# Patient Record
Sex: FEMALE | Race: BLACK OR AFRICAN AMERICAN | NOT HISPANIC OR LATINO | Employment: UNEMPLOYED | URBAN - METROPOLITAN AREA
[De-identification: names, ages, dates, MRNs, and addresses within clinical notes are randomized per-mention and may not be internally consistent; named-entity substitution may affect disease eponyms.]

---

## 2021-05-27 ENCOUNTER — OFFICE VISIT (OUTPATIENT)
Dept: DERMATOLOGY | Facility: CLINIC | Age: 36
End: 2021-05-27
Payer: COMMERCIAL

## 2021-05-27 ENCOUNTER — LAB VISIT (OUTPATIENT)
Dept: LAB | Facility: HOSPITAL | Age: 36
End: 2021-05-27
Attending: DERMATOLOGY
Payer: COMMERCIAL

## 2021-05-27 DIAGNOSIS — L59.0 ERYTHEMA AB IGNE: Primary | ICD-10-CM

## 2021-05-27 DIAGNOSIS — L59.0 ERYTHEMA AB IGNE: ICD-10-CM

## 2021-05-27 LAB
BASOPHILS # BLD AUTO: 0.03 K/UL (ref 0–0.2)
BASOPHILS NFR BLD: 0.8 % (ref 0–1.9)
DIFFERENTIAL METHOD: ABNORMAL
EOSINOPHIL # BLD AUTO: 0.1 K/UL (ref 0–0.5)
EOSINOPHIL NFR BLD: 2.1 % (ref 0–8)
ERYTHROCYTE [DISTWIDTH] IN BLOOD BY AUTOMATED COUNT: 13.7 % (ref 11.5–14.5)
HCT VFR BLD AUTO: 38.3 % (ref 37–48.5)
HGB BLD-MCNC: 11.7 G/DL (ref 12–16)
IMM GRANULOCYTES # BLD AUTO: 0.01 K/UL (ref 0–0.04)
IMM GRANULOCYTES NFR BLD AUTO: 0.3 % (ref 0–0.5)
LYMPHOCYTES # BLD AUTO: 2 K/UL (ref 1–4.8)
LYMPHOCYTES NFR BLD: 51.9 % (ref 18–48)
MCH RBC QN AUTO: 28.8 PG (ref 27–31)
MCHC RBC AUTO-ENTMCNC: 30.5 G/DL (ref 32–36)
MCV RBC AUTO: 94 FL (ref 82–98)
MONOCYTES # BLD AUTO: 0.4 K/UL (ref 0.3–1)
MONOCYTES NFR BLD: 10.8 % (ref 4–15)
NEUTROPHILS # BLD AUTO: 1.3 K/UL (ref 1.8–7.7)
NEUTROPHILS NFR BLD: 34.1 % (ref 38–73)
NRBC BLD-RTO: 0 /100 WBC
PLATELET # BLD AUTO: 248 K/UL (ref 150–450)
PMV BLD AUTO: 13 FL (ref 9.2–12.9)
RBC # BLD AUTO: 4.06 M/UL (ref 4–5.4)
WBC # BLD AUTO: 3.89 K/UL (ref 3.9–12.7)

## 2021-05-27 PROCEDURE — 85613 RUSSELL VIPER VENOM DILUTED: CPT | Performed by: DERMATOLOGY

## 2021-05-27 PROCEDURE — 36415 COLL VENOUS BLD VENIPUNCTURE: CPT | Performed by: DERMATOLOGY

## 2021-05-27 PROCEDURE — 99203 OFFICE O/P NEW LOW 30 MIN: CPT | Mod: S$GLB,,, | Performed by: DERMATOLOGY

## 2021-05-27 PROCEDURE — 99999 PR PBB SHADOW E&M-NEW PATIENT-LVL II: ICD-10-PCS | Mod: PBBFAC,,, | Performed by: DERMATOLOGY

## 2021-05-27 PROCEDURE — 86147 CARDIOLIPIN ANTIBODY EA IG: CPT | Mod: 59 | Performed by: DERMATOLOGY

## 2021-05-27 PROCEDURE — 85025 COMPLETE CBC W/AUTO DIFF WBC: CPT | Performed by: DERMATOLOGY

## 2021-05-27 PROCEDURE — 86038 ANTINUCLEAR ANTIBODIES: CPT | Performed by: DERMATOLOGY

## 2021-05-27 PROCEDURE — 99999 PR PBB SHADOW E&M-NEW PATIENT-LVL II: CPT | Mod: PBBFAC,,, | Performed by: DERMATOLOGY

## 2021-05-27 PROCEDURE — 99203 PR OFFICE/OUTPT VISIT, NEW, LEVL III, 30-44 MIN: ICD-10-PCS | Mod: S$GLB,,, | Performed by: DERMATOLOGY

## 2021-05-27 RX ORDER — TRIAMCINOLONE ACETONIDE 1 MG/G
CREAM TOPICAL 2 TIMES DAILY PRN
Qty: 80 G | Refills: 1 | Status: SHIPPED | OUTPATIENT
Start: 2021-05-27 | End: 2021-06-18

## 2021-05-28 LAB — ANA SER QL IF: NORMAL

## 2021-06-01 ENCOUNTER — PATIENT MESSAGE (OUTPATIENT)
Dept: DERMATOLOGY | Facility: CLINIC | Age: 36
End: 2021-06-01

## 2021-06-01 LAB
CARDIOLIPIN IGG SER IA-ACNC: <9.4 GPL (ref 0–14.99)
CARDIOLIPIN IGM SER IA-ACNC: 9.7 MPL (ref 0–12.49)
LA PPP-IMP: NEGATIVE

## 2021-06-03 ENCOUNTER — PATIENT MESSAGE (OUTPATIENT)
Dept: DERMATOLOGY | Facility: CLINIC | Age: 36
End: 2021-06-03

## 2021-06-04 ENCOUNTER — OFFICE VISIT (OUTPATIENT)
Dept: DERMATOLOGY | Facility: CLINIC | Age: 36
End: 2021-06-04
Payer: COMMERCIAL

## 2021-06-04 DIAGNOSIS — L98.9 DERMATOSIS: Primary | ICD-10-CM

## 2021-06-04 PROCEDURE — 11105 PUNCH BX SKIN EA SEP/ADDL: CPT | Mod: S$GLB,,, | Performed by: DERMATOLOGY

## 2021-06-04 PROCEDURE — 99999 PR PBB SHADOW E&M-EST. PATIENT-LVL III: ICD-10-PCS | Mod: PBBFAC,,, | Performed by: DERMATOLOGY

## 2021-06-04 PROCEDURE — 99999 PR PBB SHADOW E&M-EST. PATIENT-LVL III: CPT | Mod: PBBFAC,,, | Performed by: DERMATOLOGY

## 2021-06-04 PROCEDURE — 88312 SPECIAL STAINS GROUP 1: CPT | Mod: 26,,, | Performed by: PATHOLOGY

## 2021-06-04 PROCEDURE — 88305 TISSUE EXAM BY PATHOLOGIST: ICD-10-PCS | Mod: 26,,, | Performed by: PATHOLOGY

## 2021-06-04 PROCEDURE — 11104 PR PUNCH BIOPSY, SKIN, SINGLE LESION: ICD-10-PCS | Mod: S$GLB,,, | Performed by: DERMATOLOGY

## 2021-06-04 PROCEDURE — 11104 PUNCH BX SKIN SINGLE LESION: CPT | Mod: S$GLB,,, | Performed by: DERMATOLOGY

## 2021-06-04 PROCEDURE — 88305 TISSUE EXAM BY PATHOLOGIST: CPT | Performed by: PATHOLOGY

## 2021-06-04 PROCEDURE — 88312 SPECIAL STAINS GROUP 1: CPT | Performed by: PATHOLOGY

## 2021-06-04 PROCEDURE — 99214 PR OFFICE/OUTPT VISIT, EST, LEVL IV, 30-39 MIN: ICD-10-PCS | Mod: 25,S$GLB,, | Performed by: DERMATOLOGY

## 2021-06-04 PROCEDURE — 11105 PR PUNCH BIOPSY, SKIN, EA ADDTL LESION: ICD-10-PCS | Mod: S$GLB,,, | Performed by: DERMATOLOGY

## 2021-06-04 PROCEDURE — 88312 PR  SPECIAL STAINS,GROUP I: ICD-10-PCS | Mod: 26,,, | Performed by: PATHOLOGY

## 2021-06-04 PROCEDURE — 88305 TISSUE EXAM BY PATHOLOGIST: CPT | Mod: 26,,, | Performed by: PATHOLOGY

## 2021-06-04 PROCEDURE — 99214 OFFICE O/P EST MOD 30 MIN: CPT | Mod: 25,S$GLB,, | Performed by: DERMATOLOGY

## 2021-06-04 RX ORDER — VALACYCLOVIR HYDROCHLORIDE 500 MG/1
TABLET, FILM COATED ORAL
COMMUNITY
Start: 2021-05-12 | End: 2022-08-08

## 2021-06-04 RX ORDER — CIPROFLOXACIN 500 MG/1
TABLET ORAL
COMMUNITY
Start: 2021-04-20 | End: 2021-06-18

## 2021-06-04 RX ORDER — BETAMETHASONE DIPROPIONATE 0.5 MG/G
OINTMENT TOPICAL 2 TIMES DAILY
Qty: 45 G | Refills: 3 | Status: SHIPPED | OUTPATIENT
Start: 2021-06-04 | End: 2021-06-18

## 2021-06-04 RX ORDER — RIZATRIPTAN BENZOATE 10 MG/1
TABLET ORAL
COMMUNITY
Start: 2021-03-12 | End: 2022-11-02

## 2021-06-05 ENCOUNTER — PATIENT MESSAGE (OUTPATIENT)
Dept: DERMATOLOGY | Facility: CLINIC | Age: 36
End: 2021-06-05

## 2021-06-07 ENCOUNTER — PATIENT MESSAGE (OUTPATIENT)
Dept: DERMATOLOGY | Facility: CLINIC | Age: 36
End: 2021-06-07

## 2021-06-07 ENCOUNTER — TELEPHONE (OUTPATIENT)
Dept: DERMATOLOGY | Facility: CLINIC | Age: 36
End: 2021-06-07

## 2021-06-08 ENCOUNTER — PATIENT MESSAGE (OUTPATIENT)
Dept: DERMATOLOGY | Facility: CLINIC | Age: 36
End: 2021-06-08

## 2021-06-09 ENCOUNTER — TELEPHONE (OUTPATIENT)
Dept: DERMATOLOGY | Facility: CLINIC | Age: 36
End: 2021-06-09

## 2021-06-17 ENCOUNTER — TELEPHONE (OUTPATIENT)
Dept: DERMATOLOGY | Facility: CLINIC | Age: 36
End: 2021-06-17

## 2021-06-18 ENCOUNTER — CLINICAL SUPPORT (OUTPATIENT)
Dept: DERMATOLOGY | Facility: CLINIC | Age: 36
End: 2021-06-18
Payer: COMMERCIAL

## 2021-06-18 DIAGNOSIS — I77.6 VASCULITIS: Primary | ICD-10-CM

## 2021-06-18 PROCEDURE — 99999 PR PBB SHADOW E&M-EST. PATIENT-LVL I: ICD-10-PCS | Mod: PBBFAC,,,

## 2021-06-18 PROCEDURE — 87070 CULTURE OTHR SPECIMN AEROBIC: CPT | Performed by: DERMATOLOGY

## 2021-06-18 PROCEDURE — 99499 NO LOS: ICD-10-PCS | Mod: S$GLB,,, | Performed by: DERMATOLOGY

## 2021-06-18 PROCEDURE — 99499 UNLISTED E&M SERVICE: CPT | Mod: S$GLB,,, | Performed by: DERMATOLOGY

## 2021-06-18 PROCEDURE — 99999 PR PBB SHADOW E&M-EST. PATIENT-LVL I: CPT | Mod: PBBFAC,,,

## 2021-06-18 RX ORDER — MUPIROCIN 20 MG/G
OINTMENT TOPICAL
Qty: 30 G | Refills: 1 | Status: SHIPPED | OUTPATIENT
Start: 2021-06-18 | End: 2021-07-14 | Stop reason: SDUPTHER

## 2021-06-18 RX ORDER — PREDNISONE 20 MG/1
TABLET ORAL
Qty: 42 TABLET | Refills: 0 | Status: SHIPPED | OUTPATIENT
Start: 2021-06-18 | End: 2021-11-01

## 2021-06-21 LAB — BACTERIA SPEC AEROBE CULT: NORMAL

## 2021-06-24 ENCOUNTER — OFFICE VISIT (OUTPATIENT)
Dept: DERMATOLOGY | Facility: CLINIC | Age: 36
End: 2021-06-24
Payer: COMMERCIAL

## 2021-06-24 ENCOUNTER — PATIENT MESSAGE (OUTPATIENT)
Dept: DERMATOLOGY | Facility: CLINIC | Age: 36
End: 2021-06-24

## 2021-06-24 ENCOUNTER — LAB VISIT (OUTPATIENT)
Dept: LAB | Facility: HOSPITAL | Age: 36
End: 2021-06-24
Attending: DERMATOLOGY
Payer: COMMERCIAL

## 2021-06-24 DIAGNOSIS — I77.6 VASCULITIS: Primary | ICD-10-CM

## 2021-06-24 DIAGNOSIS — I77.6 VASCULITIS: ICD-10-CM

## 2021-06-24 LAB
ALBUMIN SERPL BCP-MCNC: 3.6 G/DL (ref 3.5–5.2)
ALP SERPL-CCNC: 55 U/L (ref 55–135)
ALT SERPL W/O P-5'-P-CCNC: 15 U/L (ref 10–44)
ANION GAP SERPL CALC-SCNC: 8 MMOL/L (ref 8–16)
AST SERPL-CCNC: 14 U/L (ref 10–40)
BASOPHILS # BLD AUTO: 0.07 K/UL (ref 0–0.2)
BASOPHILS NFR BLD: 0.7 % (ref 0–1.9)
BILIRUB SERPL-MCNC: 0.3 MG/DL (ref 0.1–1)
BUN SERPL-MCNC: 9 MG/DL (ref 6–20)
C3 SERPL-MCNC: 128 MG/DL (ref 50–180)
C4 SERPL-MCNC: 37 MG/DL (ref 11–44)
CALCIUM SERPL-MCNC: 9.7 MG/DL (ref 8.7–10.5)
CHLORIDE SERPL-SCNC: 103 MMOL/L (ref 95–110)
CO2 SERPL-SCNC: 30 MMOL/L (ref 23–29)
COMMENT: NORMAL
CREAT SERPL-MCNC: 0.8 MG/DL (ref 0.5–1.4)
CRP SERPL-MCNC: 3.2 MG/L (ref 0–8.2)
DIFFERENTIAL METHOD: ABNORMAL
EOSINOPHIL # BLD AUTO: 0 K/UL (ref 0–0.5)
EOSINOPHIL NFR BLD: 0.2 % (ref 0–8)
ERYTHROCYTE [DISTWIDTH] IN BLOOD BY AUTOMATED COUNT: 14.1 % (ref 11.5–14.5)
ERYTHROCYTE [SEDIMENTATION RATE] IN BLOOD BY WESTERGREN METHOD: 14 MM/HR (ref 0–36)
EST. GFR  (AFRICAN AMERICAN): >60 ML/MIN/1.73 M^2
EST. GFR  (NON AFRICAN AMERICAN): >60 ML/MIN/1.73 M^2
FINAL PATHOLOGIC DIAGNOSIS: NORMAL
GLUCOSE SERPL-MCNC: 87 MG/DL (ref 70–110)
GROSS: NORMAL
HAV IGM SERPL QL IA: NEGATIVE
HBV CORE IGM SERPL QL IA: NEGATIVE
HBV SURFACE AG SERPL QL IA: NEGATIVE
HCT VFR BLD AUTO: 37.3 % (ref 37–48.5)
HCV AB SERPL QL IA: NEGATIVE
HGB BLD-MCNC: 11.4 G/DL (ref 12–16)
HIV 1+2 AB+HIV1 P24 AG SERPL QL IA: NEGATIVE
IMM GRANULOCYTES # BLD AUTO: 0.04 K/UL (ref 0–0.04)
IMM GRANULOCYTES NFR BLD AUTO: 0.4 % (ref 0–0.5)
LYMPHOCYTES # BLD AUTO: 4.3 K/UL (ref 1–4.8)
LYMPHOCYTES NFR BLD: 43.6 % (ref 18–48)
Lab: NORMAL
MCH RBC QN AUTO: 29.5 PG (ref 27–31)
MCHC RBC AUTO-ENTMCNC: 30.6 G/DL (ref 32–36)
MCV RBC AUTO: 96 FL (ref 82–98)
MICROSCOPIC EXAM: NORMAL
MONOCYTES # BLD AUTO: 0.7 K/UL (ref 0.3–1)
MONOCYTES NFR BLD: 6.8 % (ref 4–15)
NEUTROPHILS # BLD AUTO: 4.8 K/UL (ref 1.8–7.7)
NEUTROPHILS NFR BLD: 48.3 % (ref 38–73)
NRBC BLD-RTO: 0 /100 WBC
PLATELET # BLD AUTO: 372 K/UL (ref 150–450)
PMV BLD AUTO: 12.1 FL (ref 9.2–12.9)
POTASSIUM SERPL-SCNC: 3.8 MMOL/L (ref 3.5–5.1)
PROT SERPL-MCNC: 7.2 G/DL (ref 6–8.4)
RBC # BLD AUTO: 3.87 M/UL (ref 4–5.4)
SODIUM SERPL-SCNC: 141 MMOL/L (ref 136–145)
WBC # BLD AUTO: 9.94 K/UL (ref 3.9–12.7)

## 2021-06-24 PROCEDURE — 99214 PR OFFICE/OUTPT VISIT, EST, LEVL IV, 30-39 MIN: ICD-10-PCS | Mod: S$GLB,,, | Performed by: DERMATOLOGY

## 2021-06-24 PROCEDURE — 86140 C-REACTIVE PROTEIN: CPT | Performed by: DERMATOLOGY

## 2021-06-24 PROCEDURE — 80053 COMPREHEN METABOLIC PANEL: CPT | Performed by: DERMATOLOGY

## 2021-06-24 PROCEDURE — 86160 COMPLEMENT ANTIGEN: CPT | Mod: 59 | Performed by: DERMATOLOGY

## 2021-06-24 PROCEDURE — 36415 COLL VENOUS BLD VENIPUNCTURE: CPT | Performed by: DERMATOLOGY

## 2021-06-24 PROCEDURE — 86703 HIV-1/HIV-2 1 RESULT ANTBDY: CPT | Performed by: DERMATOLOGY

## 2021-06-24 PROCEDURE — 80074 ACUTE HEPATITIS PANEL: CPT | Performed by: DERMATOLOGY

## 2021-06-24 PROCEDURE — 86160 COMPLEMENT ANTIGEN: CPT | Performed by: DERMATOLOGY

## 2021-06-24 PROCEDURE — 83516 IMMUNOASSAY NONANTIBODY: CPT | Performed by: DERMATOLOGY

## 2021-06-24 PROCEDURE — 85025 COMPLETE CBC W/AUTO DIFF WBC: CPT | Performed by: DERMATOLOGY

## 2021-06-24 PROCEDURE — 82595 ASSAY OF CRYOGLOBULIN: CPT | Performed by: DERMATOLOGY

## 2021-06-24 PROCEDURE — 85652 RBC SED RATE AUTOMATED: CPT | Performed by: DERMATOLOGY

## 2021-06-24 PROCEDURE — 99999 PR PBB SHADOW E&M-EST. PATIENT-LVL III: ICD-10-PCS | Mod: PBBFAC,,, | Performed by: DERMATOLOGY

## 2021-06-24 PROCEDURE — 99214 OFFICE O/P EST MOD 30 MIN: CPT | Mod: S$GLB,,, | Performed by: DERMATOLOGY

## 2021-06-24 PROCEDURE — 86038 ANTINUCLEAR ANTIBODIES: CPT | Performed by: DERMATOLOGY

## 2021-06-24 PROCEDURE — 86255 FLUORESCENT ANTIBODY SCREEN: CPT | Performed by: DERMATOLOGY

## 2021-06-24 PROCEDURE — 82955 ASSAY OF G6PD ENZYME: CPT | Performed by: DERMATOLOGY

## 2021-06-24 PROCEDURE — 86162 COMPLEMENT TOTAL (CH50): CPT | Performed by: DERMATOLOGY

## 2021-06-24 PROCEDURE — 87070 CULTURE OTHR SPECIMN AEROBIC: CPT | Performed by: DERMATOLOGY

## 2021-06-24 PROCEDURE — 99999 PR PBB SHADOW E&M-EST. PATIENT-LVL III: CPT | Mod: PBBFAC,,, | Performed by: DERMATOLOGY

## 2021-06-25 LAB — ANA SER QL IF: NORMAL

## 2021-06-28 LAB
ANCA AB TITR SER IF: NORMAL TITER
CH50 SERPL-ACNC: 77 U/ML (ref 42–95)
G6PD RBC-CCNT: 13.8 U/G HGB (ref 7–20.5)
MYELOPEROXIDASE AB SER-ACNC: 2 UNITS
P-ANCA TITR SER IF: NORMAL TITER

## 2021-06-29 LAB — BACTERIA SPEC AEROBE CULT: NORMAL

## 2021-07-06 ENCOUNTER — PATIENT MESSAGE (OUTPATIENT)
Dept: DERMATOLOGY | Facility: CLINIC | Age: 36
End: 2021-07-06

## 2021-07-06 LAB — CRYOGLOB SER QL: NORMAL

## 2023-07-07 ENCOUNTER — OFFICE VISIT (OUTPATIENT)
Dept: OPHTHALMOLOGY | Facility: CLINIC | Age: 38
End: 2023-07-07
Payer: COMMERCIAL

## 2023-07-07 ENCOUNTER — PATIENT MESSAGE (OUTPATIENT)
Dept: OPHTHALMOLOGY | Facility: CLINIC | Age: 38
End: 2023-07-07

## 2023-07-07 ENCOUNTER — HOSPITAL ENCOUNTER (OUTPATIENT)
Dept: RADIOLOGY | Facility: HOSPITAL | Age: 38
Discharge: HOME | End: 2023-07-07
Attending: OPTOMETRIST
Payer: COMMERCIAL

## 2023-07-07 DIAGNOSIS — H47.10 OPTIC NERVE EDEMA: ICD-10-CM

## 2023-07-07 DIAGNOSIS — H46.9 OPTIC NEURITIS, LEFT: Primary | ICD-10-CM

## 2023-07-07 DIAGNOSIS — H47.10 EDEMA OF OPTIC DISC OF LEFT EYE: ICD-10-CM

## 2023-07-07 PROCEDURE — 99204 OFFICE O/P NEW MOD 45 MIN: CPT | Mod: S$GLB,,, | Performed by: OPTOMETRIST

## 2023-07-07 PROCEDURE — 70553 MRI BRAIN STEM W/O & W/DYE: CPT | Mod: 26,,, | Performed by: STUDENT IN AN ORGANIZED HEALTH CARE EDUCATION/TRAINING PROGRAM

## 2023-07-07 PROCEDURE — 70543 MRI ORBT/FAC/NCK W/O &W/DYE: CPT | Mod: TC,PN

## 2023-07-07 PROCEDURE — A9585 GADOBUTROL INJECTION: HCPCS | Mod: PN | Performed by: OPTOMETRIST

## 2023-07-07 PROCEDURE — 1160F RVW MEDS BY RX/DR IN RCRD: CPT | Mod: CPTII,S$GLB,, | Performed by: OPTOMETRIST

## 2023-07-07 PROCEDURE — 70543 MRI HEAD-ORBITS W/WO CONTRAST (XPD): ICD-10-PCS | Mod: 26,,, | Performed by: STUDENT IN AN ORGANIZED HEALTH CARE EDUCATION/TRAINING PROGRAM

## 2023-07-07 PROCEDURE — 25500020 PHARM REV CODE 255: Mod: PN | Performed by: OPTOMETRIST

## 2023-07-07 PROCEDURE — 92083 HUMPHREY VISUAL FIELD - OU - BOTH EYES: ICD-10-PCS | Mod: S$GLB,,, | Performed by: OPTOMETRIST

## 2023-07-07 PROCEDURE — 70553 MRI BRAIN STEM W/O & W/DYE: CPT | Mod: PN

## 2023-07-07 PROCEDURE — 99999 PR PBB SHADOW E&M-EST. PATIENT-LVL III: CPT | Mod: PBBFAC,,, | Performed by: OPTOMETRIST

## 2023-07-07 PROCEDURE — 92083 EXTENDED VISUAL FIELD XM: CPT | Mod: S$GLB,,, | Performed by: OPTOMETRIST

## 2023-07-07 PROCEDURE — 70553 MRI HEAD-ORBITS W/WO CONTRAST (XPD): ICD-10-PCS | Mod: 26,,, | Performed by: STUDENT IN AN ORGANIZED HEALTH CARE EDUCATION/TRAINING PROGRAM

## 2023-07-07 PROCEDURE — 1159F MED LIST DOCD IN RCRD: CPT | Mod: CPTII,S$GLB,, | Performed by: OPTOMETRIST

## 2023-07-07 PROCEDURE — 1159F PR MEDICATION LIST DOCUMENTED IN MEDICAL RECORD: ICD-10-PCS | Mod: CPTII,S$GLB,, | Performed by: OPTOMETRIST

## 2023-07-07 PROCEDURE — 1160F PR REVIEW ALL MEDS BY PRESCRIBER/CLIN PHARMACIST DOCUMENTED: ICD-10-PCS | Mod: CPTII,S$GLB,, | Performed by: OPTOMETRIST

## 2023-07-07 PROCEDURE — 99999 PR PBB SHADOW E&M-EST. PATIENT-LVL III: ICD-10-PCS | Mod: PBBFAC,,, | Performed by: OPTOMETRIST

## 2023-07-07 PROCEDURE — 99204 PR OFFICE/OUTPT VISIT, NEW, LEVL IV, 45-59 MIN: ICD-10-PCS | Mod: S$GLB,,, | Performed by: OPTOMETRIST

## 2023-07-07 PROCEDURE — 70543 MRI ORBT/FAC/NCK W/O &W/DYE: CPT | Mod: 26,,, | Performed by: STUDENT IN AN ORGANIZED HEALTH CARE EDUCATION/TRAINING PROGRAM

## 2023-07-07 RX ORDER — IBUPROFEN 800 MG/1
800 TABLET ORAL
COMMUNITY
Start: 2023-05-20 | End: 2023-11-08

## 2023-07-07 RX ORDER — HYDROCORTISONE 25 MG/G
CREAM TOPICAL
COMMUNITY
Start: 2022-11-18 | End: 2023-11-08

## 2023-07-07 RX ORDER — GADOBUTROL 604.72 MG/ML
10 INJECTION INTRAVENOUS
Status: COMPLETED | OUTPATIENT
Start: 2023-07-07 | End: 2023-07-07

## 2023-07-07 RX ORDER — MUPIROCIN 20 MG/G
OINTMENT TOPICAL 2 TIMES DAILY
COMMUNITY
Start: 2023-02-17 | End: 2023-11-08

## 2023-07-07 RX ORDER — DOXYCYCLINE 100 MG/1
100 CAPSULE ORAL EVERY 12 HOURS
COMMUNITY
Start: 2023-07-06 | End: 2023-07-21

## 2023-07-07 RX ADMIN — GADOBUTROL 10 ML: 604.72 INJECTION INTRAVENOUS at 02:07

## 2023-07-07 NOTE — PROGRESS NOTES
"HPI     Annual Exam            Comments: NP          Comments    Vision changes since last eye exam?: States that within the last 2 weeks   her OS has been feeling  puffy watery and lots of pressure. States that   she initially thought it was pink eye but when she saw her PCP started her   on Doxycyline. States that her vision feels like its going in and out.     Any eye pain today: no OS just feels tight    Other ocular symptoms: states that she did see some floaters this morning    Interested in contact lens fitting today? no                     Last edited by Khushboo Hutchinson on 7/7/2023  9:52 AM.            Assessment /Plan     For exam results, see Encounter Report.    Optic neuritis, left    Edema of optic disc of left eye    Optic nerve edema    -     MRI Head Orbits W/Wo Contrast (XPD); Future; Expected date: 07/07/2023  -     C-Reactive Protein; Future; Expected date: 07/07/2023  -     Sedimentation rate; Future; Expected date: 07/07/2023  -     Cuevas Visual Field - OU - Extended - Both Eyes      Superior aspect of left disc margin blurred-trace edema  No hemes  Retina intact   test: full OD, "parts of cap missing, but just as red" OS  EOMs: full, Mild pulling pain when looking to the left and right    VF today shows left inferior altitudinal defect which corresponds with patients symptoms as well as with CF  Macula splitting inferior alt defect OS only-follows horizontal midline closely- consistent with NAAION            Suspect NAAION vs optic neuritis  MRI today at 14:00  ESR and CRP to r/o GCA/ AAION  Will call pt to discuss results of MRI:       MRI report:  FINDINGS:  Orbits/Optic Nerves: There is T2 hyperintense signal of the left optic nerve with associated enhancement.  It demonstrates normal size and course.  Right optic nerve is normal in course, size, and signal without abnormal enhancement.  No abnormal signal or enhancement of the right or left globe, pre-septal soft tissues, " intra-orbital soft tissues, or remainder of the optic pathway.     Remainder of the Intracranial Compartment:     Ventricles and sulci are normal in size for age without evidence of hydrocephalus. No extra-axial blood or fluid collections.     Small foci of T2/FLAIR hyperintense signal in the subcortical white matter of the frontal lobes.  No mass lesion, acute hemorrhage, edema, or acute infarct. No abnormal intra-axial enhancement.     Normal vascular flow voids are preserved.     Skull/Extracranial Contents (limited evaluation): Bone marrow signal intensity is normal.     Impression:     Findings suggesting left optic neuritis.     Nonspecific T2/FLAIR hyperintense foci in the subcortical white matter of the frontal lobes commonly seen in the setting of chronic microvascular ischemia although additional differential considerations including migraine and other nonspecified vasculopathy as well as demyelinating disease may be considered.     No acute intracranial abnormality or abnormal enhancement.        Electronically signed by: Woodrow Carlos  Date:                                            07/07/2023  Time:                                           15:11    Optic neuritis OS on MRI  Consult neuro ophthalmology

## 2023-07-07 NOTE — Clinical Note
Harsha Beavers, I wish that I could send to you as an e-consult. Not sure that is up and running yet though! Could you please take a look at Ms William' chart when you have a minute and let me know what you would recommend for her. Should she see Neurology or you guys or does she need to start any treatment now? Symptoms began about 2 weeks ago and MRI report does not indicate typical area of demyelination.   Very grateful for your input here.  Thank you, Luther

## 2023-07-10 ENCOUNTER — TELEPHONE (OUTPATIENT)
Dept: OPHTHALMOLOGY | Facility: CLINIC | Age: 38
End: 2023-07-10
Payer: COMMERCIAL

## 2023-07-10 ENCOUNTER — PATIENT MESSAGE (OUTPATIENT)
Dept: OPHTHALMOLOGY | Facility: CLINIC | Age: 38
End: 2023-07-10
Payer: COMMERCIAL

## 2023-07-10 DIAGNOSIS — H46.9 OPTIC NEURITIS, LEFT: Primary | ICD-10-CM

## 2023-07-10 NOTE — TELEPHONE ENCOUNTER
"Called pt this morning and spoke to her. She said that her eye pain "pressure" felt worse and she went to the ER at [facility]-notes not available to review as they are not on [facility].  Had neuro consult in ER, Neurologist reviewed her MyChart notes, was started on IV steroids and was given po steroids at discharge. She feels much better today and feels like her vision has improved. I told her that I would get in touch again with her after hearing from Neuro ophth.   "

## 2023-07-11 ENCOUNTER — LAB VISIT (OUTPATIENT)
Dept: LAB | Facility: HOSPITAL | Age: 38
End: 2023-07-11
Attending: INTERNAL MEDICINE
Payer: COMMERCIAL

## 2023-07-11 DIAGNOSIS — H46.9 OPTIC NEURITIS, LEFT: ICD-10-CM

## 2023-07-11 LAB — HIV 1+2 AB+HIV1 P24 AG SERPL QL IA: NORMAL

## 2023-07-11 PROCEDURE — 86038 ANTINUCLEAR ANTIBODIES: CPT | Performed by: OPTOMETRIST

## 2023-07-11 PROCEDURE — 82164 ANGIOTENSIN I ENZYME TEST: CPT | Performed by: OPTOMETRIST

## 2023-07-11 PROCEDURE — 86235 NUCLEAR ANTIGEN ANTIBODY: CPT | Mod: 59 | Performed by: OPTOMETRIST

## 2023-07-11 PROCEDURE — 87389 HIV-1 AG W/HIV-1&-2 AB AG IA: CPT | Performed by: OPTOMETRIST

## 2023-07-11 PROCEDURE — 86592 SYPHILIS TEST NON-TREP QUAL: CPT | Performed by: OPTOMETRIST

## 2023-07-11 PROCEDURE — 86611 BARTONELLA ANTIBODY: CPT | Mod: 91 | Performed by: OPTOMETRIST

## 2023-07-11 PROCEDURE — 86235 NUCLEAR ANTIGEN ANTIBODY: CPT | Performed by: OPTOMETRIST

## 2023-07-12 LAB
ANA SER-ACNC: NORMAL
ANTI-SSA ANTIBODY: 0.07 RATIO (ref 0–0.99)
ANTI-SSA INTERPRETATION: NEGATIVE
ANTI-SSB ANTIBODY: 0.07 RATIO (ref 0–0.99)
ANTI-SSB INTERPRETATION: NEGATIVE
RPR SER QL: NORMAL

## 2023-07-13 ENCOUNTER — TELEPHONE (OUTPATIENT)
Dept: NEUROLOGY | Facility: CLINIC | Age: 38
End: 2023-07-13
Payer: COMMERCIAL

## 2023-07-13 LAB
ACE SERPL-CCNC: 33 U/L (ref 16–85)
B HENSELAE IGG TITR SER IF: NORMAL TITER
B HENSELAE IGM TITR SER IF: NORMAL TITER
B QUINTANA IGG TITR SER IF: NORMAL TITER
B QUINTANA IGM TITR SER IF: NORMAL TITER

## 2023-07-13 NOTE — TELEPHONE ENCOUNTER
----- Message from Benjamin Enriquze sent at 7/13/2023 10:21 AM CDT -----  Contact: Briana Gonzalez is calling in regards to scheduling an appt from a referral. Pt would like an appt sooner than 06/25/24, preferably this year. Please call back at [phone]              Thanks  MAC

## 2023-07-18 ENCOUNTER — LAB VISIT (OUTPATIENT)
Dept: LAB | Facility: HOSPITAL | Age: 38
End: 2023-07-18
Attending: OPHTHALMOLOGY
Payer: COMMERCIAL

## 2023-07-18 ENCOUNTER — OFFICE VISIT (OUTPATIENT)
Dept: OPHTHALMOLOGY | Facility: CLINIC | Age: 38
End: 2023-07-18
Payer: COMMERCIAL

## 2023-07-18 DIAGNOSIS — H46.12 OPTIC NEURITIS, RETROBULBAR (ACUTE), LEFT: Primary | ICD-10-CM

## 2023-07-18 DIAGNOSIS — H46.12 OPTIC NEURITIS, RETROBULBAR (ACUTE), LEFT: ICD-10-CM

## 2023-07-18 PROCEDURE — 1160F RVW MEDS BY RX/DR IN RCRD: CPT | Mod: CPTII,S$GLB,, | Performed by: OPHTHALMOLOGY

## 2023-07-18 PROCEDURE — 99999 PR PBB SHADOW E&M-EST. PATIENT-LVL III: ICD-10-PCS | Mod: PBBFAC,,, | Performed by: OPHTHALMOLOGY

## 2023-07-18 PROCEDURE — 1159F PR MEDICATION LIST DOCUMENTED IN MEDICAL RECORD: ICD-10-PCS | Mod: CPTII,S$GLB,, | Performed by: OPHTHALMOLOGY

## 2023-07-18 PROCEDURE — 1159F MED LIST DOCD IN RCRD: CPT | Mod: CPTII,S$GLB,, | Performed by: OPHTHALMOLOGY

## 2023-07-18 PROCEDURE — 99205 PR OFFICE/OUTPT VISIT, NEW, LEVL V, 60-74 MIN: ICD-10-PCS | Mod: S$GLB,,, | Performed by: OPHTHALMOLOGY

## 2023-07-18 PROCEDURE — 36415 COLL VENOUS BLD VENIPUNCTURE: CPT | Performed by: OPHTHALMOLOGY

## 2023-07-18 PROCEDURE — 86053 AQAPRN-4 ANTB FLO CYTMTRY EA: CPT | Performed by: OPHTHALMOLOGY

## 2023-07-18 PROCEDURE — 99999 PR PBB SHADOW E&M-EST. PATIENT-LVL III: CPT | Mod: PBBFAC,,, | Performed by: OPHTHALMOLOGY

## 2023-07-18 PROCEDURE — 99205 OFFICE O/P NEW HI 60 MIN: CPT | Mod: S$GLB,,, | Performed by: OPHTHALMOLOGY

## 2023-07-18 PROCEDURE — 1160F PR REVIEW ALL MEDS BY PRESCRIBER/CLIN PHARMACIST DOCUMENTED: ICD-10-PCS | Mod: CPTII,S$GLB,, | Performed by: OPHTHALMOLOGY

## 2023-07-18 RX ORDER — PREDNISONE 20 MG/1
TABLET ORAL
COMMUNITY
Start: 2023-07-08 | End: 2023-11-08

## 2023-07-18 NOTE — PATIENT INSTRUCTIONS
The condition  is consistent with acute retrobulbar optic neuritis. Optic neuritis discussed in detail. New referral placed to neurology for further diagnostic testing. Blood ordered for NMO and MOG today. Referral sent to neurology for continued care. Repeat exam, HVF, and OCT  in 2 months.

## 2023-07-18 NOTE — LETTER
[facility] - 10th Fl  [address]  [facility] [address]  Phone: [phone]  Fax: [phone]   July 18, 2023    [place]  [address]  [place] [address]    Patient: Briana Thomas   MR Number: [id number]   YOB: 1985   Date of Visit: 7/18/2023       Dear Dr. Titus:    Thank you for referring Briana Thomas to me for evaluation. Here is my assessment and plan of care:    Assessment/Plan    For exam results, see Encounter Report.    Optic neuritis, retrobulbar (acute), left  -     Ambulatory referral/consult to Neurology; Future; Expected date: 07/25/2023      The condition  is consistent with acute retrobulbar optic neuritis. Optic neuritis discussed in detail. New referral placed to neurology for further diagnostic testing. Blood ordered for NMO and MOG today. Referral sent to neurology for continued care. Repeat exam, HVF, and OCT  in 2 months.          Below you will find my full exam findings. If you have questions, please do not hesitate to call me. I look forward to following Ms. Briana Thomas along with you.    Sincerely,           Bennie Gage MD       CC  No Recipients             Base Eye Exam       Visual Acuity (Snellen - Linear)         Right Left    Dist sc 20/20 CF at 3'              Tonometry (Tonopen, 3:58 PM)         Right Left    Pressure 21 11              Pupils         Dark Light Shape React APD    Right 4 2 Round Brisk None    Left 4 2 Round Brisk +2              Visual Felipe    HVF from Dr. Titus's office dated 7/7/2023 full OD, inferior altitudinal defectt OS.             Extraocular Movement         Right Left     Full, Ortho Full, Ortho              Neuro/Psych       Oriented x3: Yes    Mood/Affect: Normal              Dilation       Both eyes: 2.5% Phenylephrine, 1% Mydriacyl @ 3:57 PM                  Slit Lamp and Fundus Exam       External Exam         Right Left    External Normal Normal              Slit Lamp Exam          Right Left    Lids/Lashes Normal Normal    Conjunctiva/Sclera White and quiet White and quiet    Cornea Clear Clear    Anterior Chamber Deep and quiet Deep and quiet    Iris Round and reactive Round and reactive    Lens Clear Clear    Vitreous Normal Normal              Fundus Exam         Right Left    Disc Normal Normal    C/D Ratio 0.4 0.4    Macula Normal Normal    Vessels Normal Normal    Periphery Normal Normal

## 2023-07-18 NOTE — PROGRESS NOTES
HPI    Referred by Dr.Donavon Titus OD   Patient here for OS Optic Neuritis.  Patient states OS vision blurry/film since 06/2023 which seem to be   getting better.  No eye pain, but still feeling some tightness.(Sinus pressure)    I have personally interviewed the patient, reviewed the history and   examined the patient and agree with the technician's exam.   Last edited by Bennie Gage MD on 7/18/2023  3:22 PM.            Assessment /Plan     For exam results, see Encounter Report.    Optic neuritis, retrobulbar (acute), left  -     Ambulatory referral/consult to Neurology; Future; Expected date: 07/25/2023      The condition  is consistent with acute retrobulbar optic neuritis. Optic neuritis discussed in detail. New referral placed to neurology for further diagnostic testing. Blood ordered for NMO and MOG today. Referral sent to neurology for continued care. Repeat exam, HVF, and OCT  in 2 months.

## 2023-07-21 ENCOUNTER — LAB VISIT (OUTPATIENT)
Dept: LAB | Facility: HOSPITAL | Age: 38
End: 2023-07-21
Attending: PSYCHIATRY & NEUROLOGY
Payer: COMMERCIAL

## 2023-07-21 ENCOUNTER — OFFICE VISIT (OUTPATIENT)
Dept: NEUROLOGY | Facility: CLINIC | Age: 38
End: 2023-07-21
Payer: COMMERCIAL

## 2023-07-21 VITALS
HEART RATE: 79 BPM | SYSTOLIC BLOOD PRESSURE: 143 MMHG | HEIGHT: 64 IN | OXYGEN SATURATION: 99 % | WEIGHT: 275.56 LBS | DIASTOLIC BLOOD PRESSURE: 82 MMHG | RESPIRATION RATE: 16 BRPM | BODY MASS INDEX: 47.04 KG/M2

## 2023-07-21 DIAGNOSIS — K21.9 GASTROESOPHAGEAL REFLUX DISEASE, UNSPECIFIED WHETHER ESOPHAGITIS PRESENT: ICD-10-CM

## 2023-07-21 DIAGNOSIS — K59.00 CONSTIPATION, UNSPECIFIED CONSTIPATION TYPE: ICD-10-CM

## 2023-07-21 DIAGNOSIS — H46.12 OPTIC NEURITIS, RETROBULBAR (ACUTE), LEFT: ICD-10-CM

## 2023-07-21 DIAGNOSIS — H46.9 OPTIC NEURITIS, LEFT: ICD-10-CM

## 2023-07-21 DIAGNOSIS — H46.9 OPTIC NEURITIS, LEFT: Primary | ICD-10-CM

## 2023-07-21 LAB
BLOOD COLLECTION FOR MS PROFILE: NORMAL
BLOOD COLLECTION FOR OLIGOCLONAL PANEL: NORMAL

## 2023-07-21 PROCEDURE — 86053 AQAPRN-4 ANTB FLO CYTMTRY EA: CPT | Performed by: PSYCHIATRY & NEUROLOGY

## 2023-07-21 PROCEDURE — 86611 BARTONELLA ANTIBODY: CPT | Mod: 91 | Performed by: PSYCHIATRY & NEUROLOGY

## 2023-07-21 PROCEDURE — 36415 COLL VENOUS BLD VENIPUNCTURE: CPT | Performed by: PSYCHIATRY & NEUROLOGY

## 2023-07-21 PROCEDURE — 99205 PR OFFICE/OUTPT VISIT, NEW, LEVL V, 60-74 MIN: ICD-10-PCS | Mod: S$GLB,,, | Performed by: PSYCHIATRY & NEUROLOGY

## 2023-07-21 PROCEDURE — 3079F PR MOST RECENT DIASTOLIC BLOOD PRESSURE 80-89 MM HG: ICD-10-PCS | Mod: CPTII,S$GLB,, | Performed by: PSYCHIATRY & NEUROLOGY

## 2023-07-21 PROCEDURE — 3079F DIAST BP 80-89 MM HG: CPT | Mod: CPTII,S$GLB,, | Performed by: PSYCHIATRY & NEUROLOGY

## 2023-07-21 PROCEDURE — 1159F MED LIST DOCD IN RCRD: CPT | Mod: CPTII,S$GLB,, | Performed by: PSYCHIATRY & NEUROLOGY

## 2023-07-21 PROCEDURE — 99205 OFFICE O/P NEW HI 60 MIN: CPT | Mod: S$GLB,,, | Performed by: PSYCHIATRY & NEUROLOGY

## 2023-07-21 PROCEDURE — 99417 PR PROLONGED SVC, OUTPT, W/WO DIRECT PT CONTACT,  EA ADDTL 15 MIN: ICD-10-PCS | Mod: S$GLB,,, | Performed by: PSYCHIATRY & NEUROLOGY

## 2023-07-21 PROCEDURE — 86618 LYME DISEASE ANTIBODY: CPT | Performed by: PSYCHIATRY & NEUROLOGY

## 2023-07-21 PROCEDURE — 99999 PR PBB SHADOW E&M-EST. PATIENT-LVL V: ICD-10-PCS | Mod: PBBFAC,,, | Performed by: PSYCHIATRY & NEUROLOGY

## 2023-07-21 PROCEDURE — 3008F PR BODY MASS INDEX (BMI) DOCUMENTED: ICD-10-PCS | Mod: CPTII,S$GLB,, | Performed by: PSYCHIATRY & NEUROLOGY

## 2023-07-21 PROCEDURE — 3077F PR MOST RECENT SYSTOLIC BLOOD PRESSURE >= 140 MM HG: ICD-10-PCS | Mod: CPTII,S$GLB,, | Performed by: PSYCHIATRY & NEUROLOGY

## 2023-07-21 PROCEDURE — 1159F PR MEDICATION LIST DOCUMENTED IN MEDICAL RECORD: ICD-10-PCS | Mod: CPTII,S$GLB,, | Performed by: PSYCHIATRY & NEUROLOGY

## 2023-07-21 PROCEDURE — 86363 MOG-IGG1 ANTB FLO CYTMTRY EA: CPT | Performed by: PSYCHIATRY & NEUROLOGY

## 2023-07-21 PROCEDURE — 99999 PR PBB SHADOW E&M-EST. PATIENT-LVL V: CPT | Mod: PBBFAC,,, | Performed by: PSYCHIATRY & NEUROLOGY

## 2023-07-21 PROCEDURE — 99417 PROLNG OP E/M EACH 15 MIN: CPT | Mod: S$GLB,,, | Performed by: PSYCHIATRY & NEUROLOGY

## 2023-07-21 PROCEDURE — 3077F SYST BP >= 140 MM HG: CPT | Mod: CPTII,S$GLB,, | Performed by: PSYCHIATRY & NEUROLOGY

## 2023-07-21 PROCEDURE — 3008F BODY MASS INDEX DOCD: CPT | Mod: CPTII,S$GLB,, | Performed by: PSYCHIATRY & NEUROLOGY

## 2023-07-21 NOTE — PROGRESS NOTES
Subjective:       Patient ID: Briana Thomas is a 37 y.o. female.    Chief Complaint: Optical Neuritis           HPI      The patient was in her USOH till . She started having some discomfort and pressure in her LT eye. It was initially attributed to dry eye and sinus pressure. Within few days the symptoms progressed to pain and then painful loss of vision. Examination by Ophthalmology and Neurophthalmology confirmed Retrobulbar ON with HVF and OCT in addition to Obit MRI WWO. She went to [facility] ED and received 1 day of HDIVMP with taper and felt some relief and recover of vision.  Lab ALANIZ is so far unrevealing (MYLA-AID ALANIZ, HIV, RPR). Denied having any other symptoms. Denied weakness, numbness, balance problems, speech problems, bowel/bladder control problems. No prior history of similar problem. No fever, chills or rigors. No known family history of AID or ON.         Review of Systems   Constitutional:  Negative for appetite change and fatigue.   HENT:  Negative for hearing loss and tinnitus.    Eyes:  Positive for visual disturbance. Negative for photophobia.   Respiratory:  Negative for apnea and shortness of breath.    Cardiovascular:  Negative for chest pain and palpitations.   Gastrointestinal:  Negative for nausea and vomiting.   Endocrine: Negative for cold intolerance and heat intolerance.   Genitourinary:  Negative for difficulty urinating and urgency.   Musculoskeletal:  Negative for arthralgias, back pain, gait problem, joint swelling, myalgias, neck pain and neck stiffness.   Skin:  Negative for color change and rash.   Allergic/Immunologic: Negative for environmental allergies and immunocompromised state.   Neurological:  Negative for dizziness, tremors, seizures, syncope, facial asymmetry, speech difficulty, weakness, light-headedness, numbness and headaches.   Hematological:  Negative for adenopathy. Does not bruise/bleed easily.   Psychiatric/Behavioral:  Negative for agitation, behavioral  problems, confusion, decreased concentration, dysphoric mood, hallucinations, self-injury, sleep disturbance and suicidal ideas. The patient is not hyperactive.                Current Outpatient Medications:     cetirizine HCl/pseudoephedrine (ZYRTEC-D ORAL), , Disp: , Rfl:     hydrocortisone 2.5 % cream, Apply to the affected area twice daily x 2 weeks for any rash in folds of skin., Disp: , Rfl:     ibuprofen (ADVIL,MOTRIN) 800 MG tablet, Take 800 mg by mouth., Disp: , Rfl:     mupirocin (BACTROBAN) 2 % ointment, Apply topically 2 (two) times daily., Disp: , Rfl:     predniSONE (DELTASONE) 20 MG tablet, Take by mouth., Disp: , Rfl:     valACYclovir (VALTREX) 500 MG tablet, TAKE 1 TABLET(500 MG) BY MOUTH TWICE DAILY FOR 3 DAYS, Disp: 6 tablet, Rfl: 6    Past Medical History:   Diagnosis Date    Abnormal Pap smear of cervix     Amenorrhea     Genital herpes     Migraines     Staph infection 06/2021    Left leg       Past Surgical History:   Procedure Laterality Date    CRYOTHERAPY  05/2011    in [place]    SKIN BIOPSY  06/2021    Left Leg       Social History     Socioeconomic History    Marital status: Single   Tobacco Use    Smoking status: Never    Smokeless tobacco: Never   Substance and Sexual Activity    Alcohol use: Not Currently     Comment: Sparingly    Drug use: Never    Sexual activity: Not Currently     Partners: Male     Birth control/protection: Condom, None             Past/Current Medical/Surgical History, Past/Current Social History, Past/Current Family History and Past/Current Medications were reviewed in detail.        Objective:           VITAL SIGNS WERE REVIEWED      GENERAL APPEARANCE:     The patient looks comfortable.    BMI 47.03    No signs of respiratory distress.    Normal breathing pattern.    No dysmorphic features    Normal eye contact.       GENERAL MEDICAL EXAM:    HEENT:  Head is atraumatic normocephalic.     FUNDOSCOPIC (OPHTHALMOSCOPIC) EXAMINATION showed no disc edema  (papilledema).      NECK: No JVD. No visible lesions or goiters.     CHEST-CARDIOPULMONARY: No cyanosis. No tachypnea. Normal respiratory effort.    [id number]: No jaundice. No stomas or lesions. No visible hernias. No catheters.     SKIN, HAIR, NAILS: No pathognomonic skin rash.No neurofibromatosis. No visible lesions.No stigmata of autoimmune disease. No clubbing.    LIMBS: No varicose veins. No visible swelling.    MUSCULOSKELETAL: No visible deformities.No visible lesions.             Neurologic Exam     Mental Status   Oriented to person, place, and time.   Follows 3 step commands.   Attention: normal. Concentration: normal.   Speech: speech is normal   Level of consciousness: alert  Able to name object. Able to repeat. Normal comprehension.     Cranial Nerves     CN II   Visual acuity: decreased  Right visual field deficit: none  Left visual field deficit: lower nasal and lower temporal (Altitudinal) quadrant(s)    CN III, IV, VI   Extraocular motions are normal.   Right pupil: Size: 2 mm. Shape: regular. Reactivity: brisk. Consensual response: intact. Accommodation: intact.   Left pupil: Size: 2 mm. Shape: regular. Reactivity: brisk. Consensual response: APD. Accommodation: intact.   CN III: no CN III palsy  CN VI: no CN VI palsy  Nystagmus: none   Diplopia: none  Ophthalmoparesis: none  Upgaze: normal  Downgaze: normal  Conjugate gaze: present  Vestibulo-ocular reflex: present    CN V   Facial sensation intact.   Right facial sensation deficit: none  Left facial sensation deficit: none  Jaw jerk: normal    CN VII   Facial expression full, symmetric.   Right facial weakness: none  Left facial weakness: none    CN VIII   CN VIII normal.   Hearing: intact    CN IX, X   CN IX normal.   CN X normal.   Palate: symmetric    CN XI   CN XI normal.   Right sternocleidomastoid strength: normal  Left sternocleidomastoid strength: normal  Right trapezius strength: normal  Left trapezius strength:  normal    CN XII   CN XII normal.   Tongue: not atrophic  Fasciculations: absent  Tongue deviation: none    Motor Exam   Muscle bulk: normal  Overall muscle tone: normal  Right arm tone: normal  Left arm tone: normal  Right arm pronator drift: absent  Left arm pronator drift: absent  Right leg tone: normal  Left leg tone: normal    Strength   Right neck flexion: 5/5  Left neck flexion: 5/5  Right neck extension: 5/5  Left neck extension: 5/5  Right deltoid: 5/5  Left deltoid: 5/5  Right biceps: 5/5  Left biceps: 5/5  Right triceps: 5/5  Left triceps: 5/5  Right wrist flexion: 5/5  Left wrist flexion: 5/5  Right wrist extension: 5/5  Left wrist extension: 5/5  Right interossei: 5/5  Left interossei: 5/5  Right iliopsoas: 5/5  Left iliopsoas: 5/5  Right quadriceps: 5/5  Left quadriceps: 5/5  Right hamstrin/5  Left hamstrin/5  Right glutei: 5/5  Left glutei: 5/5  Right anterior tibial: 5/5  Left anterior tibial: 5/5  Right posterior tibial: 5/5  Left posterior tibial: 5/5  Right peroneal: 5/5  Left peroneal: 5/5  Right gastroc: 5/5  Left gastroc: 5/5    Sensory Exam   Light touch normal.   Right arm light touch: normal  Left arm light touch: normal  Right leg light touch: normal  Left leg light touch: normal  Vibration normal.   Right arm vibration: normal  Left arm vibration: normal  Right leg vibration: normal  Left leg vibration: normal  Proprioception normal.   Right arm proprioception: normal  Left arm proprioception: normal  Right leg proprioception: normal  Left leg proprioception: normal  Pinprick normal.   Right arm pinprick: normal  Left arm pinprick: normal  Right leg pinprick: normal  Left leg pinprick: normal  Graphesthesia: normal  Stereognosis: normal    Gait, Coordination, and Reflexes     Gait  Gait: normal    Coordination   Romberg: negative  Finger to nose coordination: normal  Heel to shin coordination: normal    Tremor   Resting tremor: absent  Intention tremor: absent  Action tremor:  absent    Reflexes   Right brachioradialis: 2+  Left brachioradialis: 2+  Right biceps: 2+  Left biceps: 2+  Right triceps: 2+  Left triceps: 2+  Right patellar: 2+  Left patellar: 2+  Right achilles: 2+  Left achilles: 2+  Right plantar: normal  Left plantar: normal  Right Meade: absent  Left Meade: absent  Right ankle clonus: absent  Left ankle clonus: absent  Right pendular knee jerk: absent  Left pendular knee jerk: absent      Lab Results   Component Value Date    WBC 9.94 06/24/2021    HGB 11.4 (L) 06/24/2021    HCT 37.3 06/24/2021    MCV 96 06/24/2021     06/24/2021       Sodium   Date Value Ref Range Status   11/01/2021 138 135 - 144 mmol/L Final     Potassium   Date Value Ref Range Status   11/01/2021 4.6 3.5 - 5.1 mmol/L Final     Chloride   Date Value Ref Range Status   11/01/2021 102 98 - 107 mmol/L Final     CO2   Date Value Ref Range Status   11/01/2021 29 22 - 31 mmol/L Final     Glucose   Date Value Ref Range Status   11/01/2021 86 70 - 110 mg/dL Final     BUN   Date Value Ref Range Status   11/01/2021 10 5 - 17 mg/dL Final     Creatinine   Date Value Ref Range Status   11/01/2021 0.57 0.5 - 1.0 mg/dl Final     Calcium   Date Value Ref Range Status   11/01/2021 10.1 8.4 - 10.2 mg/dL Final     Total Protein   Date Value Ref Range Status   11/01/2021 7.1 6.0 - 7.8 g/dL Final     Albumin   Date Value Ref Range Status   06/24/2021 3.6 3.5 - 5.2 g/dL Final     Total Bilirubin   Date Value Ref Range Status   11/01/2021 0.5 0 - 1.3 mg/dL Final     Alkaline Phosphatase   Date Value Ref Range Status   11/01/2021 62 50 - 150 U/L Final     AST   Date Value Ref Range Status   11/01/2021 26 10 - 50 U/L Final     ALT   Date Value Ref Range Status   11/01/2021 14 <35 U/L Final     Comment:     **NOTE: Effective 1/22/20: ALT result represents a new  improved methodology. The Normal Range has changed  accordingly and previous ALT patient results should  not be compared to the current result.       Anion  Gap   Date Value Ref Range Status   06/24/2021 8 8 - 16 mmol/L Final     eGFR if    Date Value Ref Range Status   06/24/2021 >60.0 >60 mL/min/1.73 m^2 Final     eGFR if non    Date Value Ref Range Status   06/24/2021 >60.0 >60 mL/min/1.73 m^2 Final     Comment:     Calculation used to obtain the estimated glomerular filtration  rate (eGFR) is the CKD-EPI equation.              LABORATORY EVALUATION      07-    MYLA-AID Panel -ve, HIV -ve, RPR -ve, Bartonella Ab -ve       07-    Blood/Serum Labs NL    Lyme, Bartonella, AQP-4 IGG (NMO)  and MOG IGG -ve    RADIOLOGY EVALUATION     07-    Brain MRI WWO LT ON          07-    Demyelinating C/T Spine MRIs WWO Unremarkable         OPHTHALMOLOGY EVALUATION    07-    HVF and OCT LT ON                   CARDIOLOGY EVALUATION       NEUROPHYSIOLOGY EVALUATION       PATHOLOGY EVALUATION        NEUROCOGNITIVE AND NEUROPSYCHOLOGY EVALUATION           Reviewed the neuroimaging independently       Assessment:           1. Constipation, unspecified constipation type    2. Optic neuritis, left    3. Gastroesophageal reflux disease, unspecified whether esophagitis present          Plan:             IDIOPATHIC LEFT RETROBULBAR OPTIC NEURITIS (ON),          Add 3 more days of HDIVMP 1000 mg Solumedrol IVPB QD.      Initiate a thorough evaluation to stratify the risk of recurrence and multiple sclerosis     SC (C/T) Spine Demyelinating MRI WWO.    Lyme, Bartonella Panels.    NMO Antibody and MOG Antibody.    CSF Kappa free chain, OCBs and MS Panel.                   MEDICAL/SURGICAL COMORBIDITIES     All relevant medical comorbidities noted and managed by primary care physician and medical care team.          HEALTHY LIFESTYLE AND PREVENTATIVE CARE    The patient to adhere to the age-appropriate health maintenance guidelines including screening tests and vaccinations. The patient to adhere to  healthy lifestyle,  optimal weight, exercise, healthy diet, good sleep hygiene and avoiding drugs including smoking, alcohol and recreational drugs.          RTC WITH RESULTS         Mecca Sloan MD, FAAN    Attending Neurologist/Epileptologist         Diplomate, American Board of Psychiatry and Neurology    Diplomate, American Board of Clinical Neurophysiology     Fellow, American Academy of Neurology           I spent a total of 100 minutes on the day of the visit.  This includes face to face time and non-face to face time preparing to see the patient (eg, review of tests), obtaining and/or reviewing separately obtained history, documenting clinical information in the electronic or other health record, independently interpreting results and communicating results to the patient/family/caregiver, or care coordinator.

## 2023-07-24 LAB
B BURGDOR AB SER IA-ACNC: 0.62 INDEX VALUE
B HENSELAE IGG TITR SER IF: NORMAL TITER
B HENSELAE IGM TITR SER IF: NORMAL TITER
B QUINTANA IGG TITR SER IF: NORMAL TITER
B QUINTANA IGM TITR SER IF: NORMAL TITER

## 2023-07-25 ENCOUNTER — HOSPITAL ENCOUNTER (OUTPATIENT)
Dept: RADIOLOGY | Facility: HOSPITAL | Age: 38
Discharge: HOME | End: 2023-07-25
Attending: PSYCHIATRY & NEUROLOGY
Payer: COMMERCIAL

## 2023-07-25 DIAGNOSIS — K59.00 CONSTIPATION, UNSPECIFIED CONSTIPATION TYPE: ICD-10-CM

## 2023-07-25 DIAGNOSIS — H46.9 OPTIC NEURITIS, LEFT: ICD-10-CM

## 2023-07-25 DIAGNOSIS — K21.9 GASTROESOPHAGEAL REFLUX DISEASE, UNSPECIFIED WHETHER ESOPHAGITIS PRESENT: ICD-10-CM

## 2023-07-25 PROCEDURE — 72157 MRI CHEST SPINE W/O & W/DYE: CPT | Mod: 26,,, | Performed by: STUDENT IN AN ORGANIZED HEALTH CARE EDUCATION/TRAINING PROGRAM

## 2023-07-25 PROCEDURE — 72157 MRI CHEST SPINE W/O & W/DYE: CPT | Mod: TC,PN

## 2023-07-25 PROCEDURE — 72156 MRI NECK SPINE W/O & W/DYE: CPT | Mod: 26,,, | Performed by: STUDENT IN AN ORGANIZED HEALTH CARE EDUCATION/TRAINING PROGRAM

## 2023-07-25 PROCEDURE — 72156 MRI CERVICAL SPINE DEMYELINATING W W/O CONTRAST: ICD-10-PCS | Mod: 26,,, | Performed by: STUDENT IN AN ORGANIZED HEALTH CARE EDUCATION/TRAINING PROGRAM

## 2023-07-25 PROCEDURE — 72156 MRI NECK SPINE W/O & W/DYE: CPT | Mod: TC,PN

## 2023-07-25 PROCEDURE — 72157 MRI THORACIC SPINE DEMYELINATING W W/O CONTRAST: ICD-10-PCS | Mod: 26,,, | Performed by: STUDENT IN AN ORGANIZED HEALTH CARE EDUCATION/TRAINING PROGRAM

## 2023-07-25 PROCEDURE — 25500020 PHARM REV CODE 255: Mod: PN | Performed by: PSYCHIATRY & NEUROLOGY

## 2023-07-25 PROCEDURE — A9585 GADOBUTROL INJECTION: HCPCS | Mod: PN | Performed by: PSYCHIATRY & NEUROLOGY

## 2023-07-25 RX ORDER — GADOBUTROL 604.72 MG/ML
10 INJECTION INTRAVENOUS
Status: COMPLETED | OUTPATIENT
Start: 2023-07-25 | End: 2023-07-25

## 2023-07-25 RX ADMIN — GADOBUTROL 10 ML: 604.72 INJECTION INTRAVENOUS at 01:07

## 2023-07-26 ENCOUNTER — PATIENT MESSAGE (OUTPATIENT)
Dept: OPHTHALMOLOGY | Facility: CLINIC | Age: 38
End: 2023-07-26
Payer: COMMERCIAL

## 2023-07-26 ENCOUNTER — INFUSION (OUTPATIENT)
Dept: INFUSION THERAPY | Facility: HOSPITAL | Age: 38
End: 2023-07-26
Attending: INTERNAL MEDICINE
Payer: COMMERCIAL

## 2023-07-26 VITALS
BODY MASS INDEX: 47.68 KG/M2 | WEIGHT: 279.31 LBS | TEMPERATURE: 98 F | DIASTOLIC BLOOD PRESSURE: 77 MMHG | RESPIRATION RATE: 16 BRPM | HEART RATE: 82 BPM | OXYGEN SATURATION: 99 % | HEIGHT: 64 IN | SYSTOLIC BLOOD PRESSURE: 128 MMHG

## 2023-07-26 DIAGNOSIS — H46.9 OPTIC NEURITIS, LEFT: Primary | ICD-10-CM

## 2023-07-26 LAB — NMO/AQP4 FACS,S: NEGATIVE

## 2023-07-26 PROCEDURE — 96365 THER/PROPH/DIAG IV INF INIT: CPT

## 2023-07-26 PROCEDURE — 63600175 PHARM REV CODE 636 W HCPCS: Performed by: PSYCHIATRY & NEUROLOGY

## 2023-07-26 PROCEDURE — 25000003 PHARM REV CODE 250: Performed by: PSYCHIATRY & NEUROLOGY

## 2023-07-26 RX ADMIN — SODIUM CHLORIDE 1000 MG: 9 INJECTION, SOLUTION INTRAVENOUS at 02:07

## 2023-07-26 NOTE — DISCHARGE INSTRUCTIONS
FALL PREVENTION   Falls often occur due to slipping, tripping or losing your balance. Here are ways to reduce your risk of falling again.   Was there anything that caused your fall that can be fixed, removed or replaced?   Make your home safe by keeping walkways clear of objects you may trip over.   Use non-slip pads under rugs.   Do not walk in poorly lit areas.   Do not stand on chairs or wobbly ladders.   Use caution when reaching overhead or looking upward. This position can cause a loss of balance.   Be sure your shoes fit properly, have non-slip bottoms and are in good condition.   Be cautious when going up and down stairs, curbs, and when walking on uneven sidewalks.   If your balance is poor, consider using a cane or walker.   If your fall was related to alcohol use, stop or limit alcohol intake.   If your fall was related to use of sleeping medicines, talk to your doctor about this. You may need to reduce your dosage at bedtime if you awaken during the night to go to the bathroom.   To reduce the need for nighttime bathroom trips:   Avoid drinking fluids for several hours before going to bed   Empty your bladder before going to bed   Men can keep a urinal at the bedside   © 3258-8824 [facility], [address] [place] [address], [place] [address]. All rights reserved. This information is not intended as a substitute for professional medical care. Always follow your healthcare professional's instructions.

## 2023-07-26 NOTE — NURSING
Reviewed plan of care and discussed treatment with patient. Receiving Solumedrol IVPB 1 of 3 to infuse over 30minutes.  Patient verbalized understanding.  Addressed any ongoing concerns; states a little eye pressure to the left eye.  Stated the Solumedrol infusion given in the ED has helped.  Tolerated treatment. No adverse reaction. To return to clinic for next Solumedrol infusion on 7/27/2023.

## 2023-07-26 NOTE — PLAN OF CARE
Problem: Adult Inpatient Plan of Care  Goal: Plan of Care Review  Outcome: Ongoing, Progressing  Flowsheets (Taken 7/26/2023 1512)  Plan of Care Reviewed With: patient  Goal: Patient-Specific Goal (Individualized)  Outcome: Ongoing, Progressing  Flowsheets (Taken 7/26/2023 1512)  Anxieties, Fears or Concerns: eye pressure to the left eye  Individualized Care Needs:   feet elevated   snack/drink provided  Goal: Optimal Comfort and Wellbeing  Outcome: Ongoing, Progressing  Intervention: Monitor Pain and Promote Comfort  Flowsheets (Taken 7/26/2023 1512)  Pain Management Interventions:   quiet environment facilitated   relaxation techniques promoted  Intervention: Provide Person-Centered Care  Flowsheets (Taken 7/26/2023 1512)  Trust Relationship/Rapport:   care explained   questions encouraged   choices provided   reassurance provided   emotional support provided   thoughts/feelings acknowledged   empathic listening provided   questions answered     Problem: Fall Injury Risk  Goal: Absence of Fall and Fall-Related Injury  Outcome: Ongoing, Progressing  Intervention: Identify and Manage Contributors  Flowsheets (Taken 7/26/2023 1512)  Self-Care Promotion: independence encouraged  Medication Review/Management: medications reviewed  Intervention: Promote Injury-Free Environment  Flowsheets (Taken 7/26/2023 1512)  Safety Promotion/Fall Prevention:   medications reviewed   in recliner, wheels locked   nonskid shoes/socks when out of bed

## 2023-07-27 ENCOUNTER — INFUSION (OUTPATIENT)
Dept: INFUSION THERAPY | Facility: HOSPITAL | Age: 38
End: 2023-07-27
Attending: PSYCHIATRY & NEUROLOGY
Payer: COMMERCIAL

## 2023-07-27 VITALS
HEART RATE: 79 BPM | DIASTOLIC BLOOD PRESSURE: 90 MMHG | TEMPERATURE: 99 F | RESPIRATION RATE: 16 BRPM | SYSTOLIC BLOOD PRESSURE: 147 MMHG | OXYGEN SATURATION: 98 %

## 2023-07-27 DIAGNOSIS — H46.9 OPTIC NEURITIS, LEFT: Primary | ICD-10-CM

## 2023-07-27 PROCEDURE — 96365 THER/PROPH/DIAG IV INF INIT: CPT

## 2023-07-27 PROCEDURE — 63600175 PHARM REV CODE 636 W HCPCS: Performed by: PSYCHIATRY & NEUROLOGY

## 2023-07-27 PROCEDURE — 25000003 PHARM REV CODE 250: Performed by: PSYCHIATRY & NEUROLOGY

## 2023-07-27 RX ADMIN — SODIUM CHLORIDE 1000 MG: 9 INJECTION, SOLUTION INTRAVENOUS at 02:07

## 2023-07-27 NOTE — NURSING
Infusion # 2 of 3 - Solumedrol 1,000 mg q day x 3 days  Last dose- 7/26/23     Solumedrol  1,000 mg administered IV at a 30 minute rate per orders; see MAR and vitals for more details. Tolerated well without adverse events, discharged and ambulatory out of clinic.

## 2023-07-27 NOTE — PLAN OF CARE
Plan of care reviewed with patient. Discussed if there are any new or ongoing concerns. Denies.   Problem: Adult Inpatient Plan of Care  Goal: Plan of Care Review  Outcome: Ongoing, Progressing  Flowsheets (Taken 7/27/2023 1341)  Plan of Care Reviewed With: patient  Goal: Patient-Specific Goal (Individualized)  Outcome: Ongoing, Progressing  Goal: Optimal Comfort and Wellbeing  Outcome: Ongoing, Progressing  Intervention: Provide Person-Centered Care  Flowsheets (Taken 7/27/2023 1341)  Trust Relationship/Rapport:   care explained   questions encouraged   choices provided   reassurance provided   emotional support provided   thoughts/feelings acknowledged   empathic listening provided   questions answered  Goal: Absence of Hospital-Acquired Illness or Injury  Outcome: Ongoing, Progressing  Intervention: Identify and Manage Fall Risk  Flowsheets (Taken 7/27/2023 1341)  Safety Promotion/Fall Prevention: in recliner, wheels locked

## 2023-07-28 ENCOUNTER — INFUSION (OUTPATIENT)
Dept: INFUSION THERAPY | Facility: HOSPITAL | Age: 38
End: 2023-07-28
Payer: COMMERCIAL

## 2023-07-28 VITALS
DIASTOLIC BLOOD PRESSURE: 89 MMHG | RESPIRATION RATE: 18 BRPM | TEMPERATURE: 98 F | OXYGEN SATURATION: 99 % | SYSTOLIC BLOOD PRESSURE: 146 MMHG | HEART RATE: 71 BPM

## 2023-07-28 DIAGNOSIS — H46.9 OPTIC NEURITIS, LEFT: Primary | ICD-10-CM

## 2023-07-28 LAB
CNS DEMYELINATING DISEASE EVAL: NORMAL
MOG-IGG1: NEGATIVE
NMO/AQP4 FACS,S: NEGATIVE

## 2023-07-28 PROCEDURE — 63600175 PHARM REV CODE 636 W HCPCS: Performed by: PSYCHIATRY & NEUROLOGY

## 2023-07-28 PROCEDURE — 96365 THER/PROPH/DIAG IV INF INIT: CPT

## 2023-07-28 PROCEDURE — 25000003 PHARM REV CODE 250: Performed by: PSYCHIATRY & NEUROLOGY

## 2023-07-28 RX ADMIN — SODIUM CHLORIDE 1000 MG: 9 INJECTION, SOLUTION INTRAVENOUS at 02:07

## 2023-07-28 NOTE — PLAN OF CARE
Problem: Adult Inpatient Plan of Care  Goal: Plan of Care Review  Outcome: Ongoing, Progressing  Flowsheets (Taken 7/28/2023 1452)  Plan of Care Reviewed With: patient  Goal: Patient-Specific Goal (Individualized)  Outcome: Ongoing, Progressing  Flowsheets (Taken 7/28/2023 1452)  Anxieties, Fears or Concerns: denies  Individualized Care Needs:   feet elevated   drink provided  Goal: Optimal Comfort and Wellbeing  Outcome: Ongoing, Progressing  Intervention: Monitor Pain and Promote Comfort  Flowsheets (Taken 7/28/2023 1452)  Pain Management Interventions:   quiet environment facilitated   relaxation techniques promoted  Intervention: Provide Person-Centered Care  Flowsheets (Taken 7/28/2023 1452)  Trust Relationship/Rapport:   care explained   empathic listening provided   reassurance provided   thoughts/feelings acknowledged   questions answered   choices provided   emotional support provided   questions encouraged

## 2023-07-28 NOTE — NURSING
Reviewed plan of care and discussed treatment with patient.  Receiving Solumedrol IVPB 3 of 3 to infuse over 30 minutes.  Patient verbalized understanding. Addressed any ongoing concerns; denies. Tolerated treatment. No adverse reaction.

## 2023-10-09 ENCOUNTER — OFFICE VISIT (OUTPATIENT)
Dept: OPHTHALMOLOGY | Facility: CLINIC | Age: 38
End: 2023-10-09
Payer: COMMERCIAL

## 2023-10-09 ENCOUNTER — CLINICAL SUPPORT (OUTPATIENT)
Dept: OPHTHALMOLOGY | Facility: CLINIC | Age: 38
End: 2023-10-09
Payer: COMMERCIAL

## 2023-10-09 DIAGNOSIS — H46.12 OPTIC NEURITIS, RETROBULBAR (ACUTE), LEFT: Primary | ICD-10-CM

## 2023-10-09 PROCEDURE — 92083 EXTENDED VISUAL FIELD XM: CPT | Mod: S$GLB,,, | Performed by: OPHTHALMOLOGY

## 2023-10-09 PROCEDURE — 92133 CPTRZD OPH DX IMG PST SGM ON: CPT | Mod: S$GLB,,, | Performed by: OPHTHALMOLOGY

## 2023-10-09 PROCEDURE — 92133 POSTERIOR SEGMENT OCT OPTIC NERVE(OCULAR COHERENCE TOMOGRAPHY) - OU - BOTH EYES: ICD-10-PCS | Mod: S$GLB,,, | Performed by: OPHTHALMOLOGY

## 2023-10-09 PROCEDURE — 99214 PR OFFICE/OUTPT VISIT, EST, LEVL IV, 30-39 MIN: ICD-10-PCS | Mod: S$GLB,,, | Performed by: OPHTHALMOLOGY

## 2023-10-09 PROCEDURE — 1160F PR REVIEW ALL MEDS BY PRESCRIBER/CLIN PHARMACIST DOCUMENTED: ICD-10-PCS | Mod: CPTII,S$GLB,, | Performed by: OPHTHALMOLOGY

## 2023-10-09 PROCEDURE — 1160F RVW MEDS BY RX/DR IN RCRD: CPT | Mod: CPTII,S$GLB,, | Performed by: OPHTHALMOLOGY

## 2023-10-09 PROCEDURE — 92083 HUMPHREY VISUAL FIELD - OU - BOTH EYES: ICD-10-PCS | Mod: S$GLB,,, | Performed by: OPHTHALMOLOGY

## 2023-10-09 PROCEDURE — 99999 PR PBB SHADOW E&M-EST. PATIENT-LVL III: ICD-10-PCS | Mod: PBBFAC,,, | Performed by: OPHTHALMOLOGY

## 2023-10-09 PROCEDURE — 99999 PR PBB SHADOW E&M-EST. PATIENT-LVL III: CPT | Mod: PBBFAC,,, | Performed by: OPHTHALMOLOGY

## 2023-10-09 PROCEDURE — 1159F PR MEDICATION LIST DOCUMENTED IN MEDICAL RECORD: ICD-10-PCS | Mod: CPTII,S$GLB,, | Performed by: OPHTHALMOLOGY

## 2023-10-09 PROCEDURE — 1159F MED LIST DOCD IN RCRD: CPT | Mod: CPTII,S$GLB,, | Performed by: OPHTHALMOLOGY

## 2023-10-09 PROCEDURE — 99214 OFFICE O/P EST MOD 30 MIN: CPT | Mod: S$GLB,,, | Performed by: OPHTHALMOLOGY

## 2023-10-09 RX ORDER — HYDROCODONE BITARTRATE AND ACETAMINOPHEN 5; 325 MG/1; MG/1
TABLET ORAL
COMMUNITY
Start: 2023-07-08 | End: 2023-11-08

## 2023-10-09 NOTE — PATIENT INSTRUCTIONS
Ms. Thomas has recovered well from her optic neuritis.. No further diagnostic testing is indicate. Return as requested.

## 2023-10-09 NOTE — PROGRESS NOTES
HPI    DLS:07/18/2023 Too  Patient here for 2 month follow up Optic Neuritis OS.  Pt states some improvement,but a little blurry.  No eye pain.    Review HVF/OCT    I have personally interviewed the patient, reviewed the history and   examined the patient and agree with the technician's exam.   Last edited by Bennie Gage MD on 10/9/2023  2:57 PM.            Assessment /Plan     For exam results, see Encounter Report.    Optic neuritis, retrobulbar (acute), left  -     Cuevas Visual Field - OU - Extended - Both Eyes; Future  -     Posterior Segment OCT Optic Nerve- Both eyes; Future      MsYamileth Thomas has recovered well from her optic neuritis.. No further diagnostic testing is indicate. Return as requested.

## 2023-10-09 NOTE — PROGRESS NOTES
VISUAL FIELD TEST 24-2 SFAST-OU-DONE/AD  REL-FIX-COOP-OU-GOOD/AD    PT HAS NO KNOWN ALLERGIES TO LATEX OR ADHESIVES./AD

## 2023-11-06 ENCOUNTER — OFFICE VISIT (OUTPATIENT)
Dept: OPHTHALMOLOGY | Facility: CLINIC | Age: 38
End: 2023-11-06
Payer: COMMERCIAL

## 2023-11-06 DIAGNOSIS — H52.223 REGULAR ASTIGMATISM OF BOTH EYES: ICD-10-CM

## 2023-11-06 DIAGNOSIS — H04.129 DRY EYE: ICD-10-CM

## 2023-11-06 DIAGNOSIS — H46.12 OPTIC NEURITIS, RETROBULBAR (ACUTE), LEFT: Primary | ICD-10-CM

## 2023-11-06 PROCEDURE — 1160F PR REVIEW ALL MEDS BY PRESCRIBER/CLIN PHARMACIST DOCUMENTED: ICD-10-PCS | Mod: CPTII,S$GLB,, | Performed by: OPTOMETRIST

## 2023-11-06 PROCEDURE — 99999 PR PBB SHADOW E&M-EST. PATIENT-LVL III: ICD-10-PCS | Mod: PBBFAC,,, | Performed by: OPTOMETRIST

## 2023-11-06 PROCEDURE — 92015 DETERMINE REFRACTIVE STATE: CPT | Mod: S$GLB,,, | Performed by: OPTOMETRIST

## 2023-11-06 PROCEDURE — 99999 PR PBB SHADOW E&M-EST. PATIENT-LVL III: CPT | Mod: PBBFAC,,, | Performed by: OPTOMETRIST

## 2023-11-06 PROCEDURE — 1160F RVW MEDS BY RX/DR IN RCRD: CPT | Mod: CPTII,S$GLB,, | Performed by: OPTOMETRIST

## 2023-11-06 PROCEDURE — 92015 PR REFRACTION: ICD-10-PCS | Mod: S$GLB,,, | Performed by: OPTOMETRIST

## 2023-11-06 PROCEDURE — 1159F MED LIST DOCD IN RCRD: CPT | Mod: CPTII,S$GLB,, | Performed by: OPTOMETRIST

## 2023-11-06 PROCEDURE — 92014 PR EYE EXAM, EST PATIENT,COMPREHESV: ICD-10-PCS | Mod: S$GLB,,, | Performed by: OPTOMETRIST

## 2023-11-06 PROCEDURE — 92014 COMPRE OPH EXAM EST PT 1/>: CPT | Mod: S$GLB,,, | Performed by: OPTOMETRIST

## 2023-11-06 PROCEDURE — 1159F PR MEDICATION LIST DOCUMENTED IN MEDICAL RECORD: ICD-10-PCS | Mod: CPTII,S$GLB,, | Performed by: OPTOMETRIST

## 2023-11-06 NOTE — PROGRESS NOTES
HPI     Eye Problem            Comments: Pt states OD is starting to experiencing OS symptoms of   neuritis with red but cleared up and itchy. Pt states OS is still blurry.     Last MRI: 7/21/23  Last HVF: 10/09/23  Last gOCT: 11/06/23         Last edited by Glenna Solares MA on 11/6/2023  3:56 PM.            Assessment /Plan     For exam results, see Encounter Report.    Optic neuritis, retrobulbar (acute), left  Resolved    Dry eye  Start iVizia prn-bid OU    Regular astigmatism of both eyes  No correction is required at this time.   Discussed refraction with patient.      RTC 1 yr for dilated eye exam and gOCT/dOCT or PRN if any problems.   Discussed above and answered questions.

## 2024-01-29 ENCOUNTER — PATIENT MESSAGE (OUTPATIENT)
Dept: PRIMARY CARE CLINIC | Facility: CLINIC | Age: 39
End: 2024-01-29

## 2024-01-29 ENCOUNTER — LAB VISIT (OUTPATIENT)
Dept: LAB | Facility: HOSPITAL | Age: 39
End: 2024-01-29
Attending: FAMILY MEDICINE
Payer: COMMERCIAL

## 2024-01-29 ENCOUNTER — OFFICE VISIT (OUTPATIENT)
Dept: PRIMARY CARE CLINIC | Facility: CLINIC | Age: 39
End: 2024-01-29
Payer: COMMERCIAL

## 2024-01-29 VITALS
DIASTOLIC BLOOD PRESSURE: 82 MMHG | HEIGHT: 64 IN | WEIGHT: 275.63 LBS | OXYGEN SATURATION: 99 % | BODY MASS INDEX: 47.05 KG/M2 | HEART RATE: 96 BPM | TEMPERATURE: 96 F | SYSTOLIC BLOOD PRESSURE: 126 MMHG

## 2024-01-29 DIAGNOSIS — K59.00 CONSTIPATION, UNSPECIFIED CONSTIPATION TYPE: ICD-10-CM

## 2024-01-29 DIAGNOSIS — H46.9 OPTIC NEURITIS, LEFT: ICD-10-CM

## 2024-01-29 DIAGNOSIS — R63.5 WEIGHT GAIN: ICD-10-CM

## 2024-01-29 DIAGNOSIS — E66.01 CLASS 3 SEVERE OBESITY DUE TO EXCESS CALORIES WITH SERIOUS COMORBIDITY AND BODY MASS INDEX (BMI) OF 45.0 TO 49.9 IN ADULT: ICD-10-CM

## 2024-01-29 DIAGNOSIS — Z86.69 HISTORY OF MIGRAINE: ICD-10-CM

## 2024-01-29 DIAGNOSIS — Z83.3 FAMILY HISTORY OF DIABETES MELLITUS (DM): Primary | ICD-10-CM

## 2024-01-29 DIAGNOSIS — K21.9 GASTROESOPHAGEAL REFLUX DISEASE, UNSPECIFIED WHETHER ESOPHAGITIS PRESENT: ICD-10-CM

## 2024-01-29 DIAGNOSIS — Z83.3 FAMILY HISTORY OF DIABETES MELLITUS (DM): ICD-10-CM

## 2024-01-29 PROBLEM — E66.813 CLASS 3 SEVERE OBESITY DUE TO EXCESS CALORIES WITH SERIOUS COMORBIDITY AND BODY MASS INDEX (BMI) OF 45.0 TO 49.9 IN ADULT: Status: ACTIVE | Noted: 2024-01-29

## 2024-01-29 LAB
ALBUMIN SERPL BCP-MCNC: 3.9 G/DL (ref 3.5–5.2)
ALP SERPL-CCNC: 65 U/L (ref 55–135)
ALT SERPL W/O P-5'-P-CCNC: 21 U/L (ref 10–44)
ANION GAP SERPL CALC-SCNC: 10 MMOL/L (ref 8–16)
AST SERPL-CCNC: 19 U/L (ref 10–40)
BASOPHILS # BLD AUTO: 0.05 K/UL (ref 0–0.2)
BASOPHILS NFR BLD: 1.2 % (ref 0–1.9)
BILIRUB SERPL-MCNC: 0.3 MG/DL (ref 0.1–1)
BUN SERPL-MCNC: 9 MG/DL (ref 6–20)
CALCIUM SERPL-MCNC: 9.6 MG/DL (ref 8.7–10.5)
CHLORIDE SERPL-SCNC: 105 MMOL/L (ref 95–110)
CHOLEST SERPL-MCNC: 206 MG/DL (ref 120–199)
CHOLEST/HDLC SERPL: 2.9 {RATIO} (ref 2–5)
CO2 SERPL-SCNC: 25 MMOL/L (ref 23–29)
CREAT SERPL-MCNC: 0.8 MG/DL (ref 0.5–1.4)
DIFFERENTIAL METHOD BLD: ABNORMAL
EOSINOPHIL # BLD AUTO: 0.2 K/UL (ref 0–0.5)
EOSINOPHIL NFR BLD: 3.8 % (ref 0–8)
ERYTHROCYTE [DISTWIDTH] IN BLOOD BY AUTOMATED COUNT: 13.5 % (ref 11.5–14.5)
EST. GFR  (NO RACE VARIABLE): >60 ML/MIN/1.73 M^2
ESTIMATED AVG GLUCOSE: 111 MG/DL (ref 68–131)
GLUCOSE SERPL-MCNC: 86 MG/DL (ref 70–110)
HBA1C MFR BLD: 5.5 % (ref 4–5.6)
HCT VFR BLD AUTO: 41.1 % (ref 37–48.5)
HDLC SERPL-MCNC: 71 MG/DL (ref 40–75)
HDLC SERPL: 34.5 % (ref 20–50)
HGB BLD-MCNC: 12.5 G/DL (ref 12–16)
IMM GRANULOCYTES # BLD AUTO: 0.01 K/UL (ref 0–0.04)
IMM GRANULOCYTES NFR BLD AUTO: 0.2 % (ref 0–0.5)
INSULIN COLLECTION INTERVAL: NORMAL
INSULIN SERPL-ACNC: 12.6 UU/ML
LDLC SERPL CALC-MCNC: 121 MG/DL (ref 63–159)
LYMPHOCYTES # BLD AUTO: 1.9 K/UL (ref 1–4.8)
LYMPHOCYTES NFR BLD: 45 % (ref 18–48)
MCH RBC QN AUTO: 28.5 PG (ref 27–31)
MCHC RBC AUTO-ENTMCNC: 30.4 G/DL (ref 32–36)
MCV RBC AUTO: 94 FL (ref 82–98)
MONOCYTES # BLD AUTO: 0.4 K/UL (ref 0.3–1)
MONOCYTES NFR BLD: 9.4 % (ref 4–15)
NEUTROPHILS # BLD AUTO: 1.7 K/UL (ref 1.8–7.7)
NEUTROPHILS NFR BLD: 40.4 % (ref 38–73)
NONHDLC SERPL-MCNC: 135 MG/DL
NRBC BLD-RTO: 0 /100 WBC
PLATELET # BLD AUTO: 295 K/UL (ref 150–450)
PMV BLD AUTO: 12.1 FL (ref 9.2–12.9)
POTASSIUM SERPL-SCNC: 4.2 MMOL/L (ref 3.5–5.1)
PROT SERPL-MCNC: 7.3 G/DL (ref 6–8.4)
RBC # BLD AUTO: 4.39 M/UL (ref 4–5.4)
SODIUM SERPL-SCNC: 140 MMOL/L (ref 136–145)
TRIGL SERPL-MCNC: 70 MG/DL (ref 30–150)
TSH SERPL DL<=0.005 MIU/L-ACNC: 2.07 UIU/ML (ref 0.4–4)
WBC # BLD AUTO: 4.16 K/UL (ref 3.9–12.7)

## 2024-01-29 PROCEDURE — 3074F SYST BP LT 130 MM HG: CPT | Mod: CPTII,S$GLB,, | Performed by: FAMILY MEDICINE

## 2024-01-29 PROCEDURE — 99204 OFFICE O/P NEW MOD 45 MIN: CPT | Mod: S$GLB,,, | Performed by: FAMILY MEDICINE

## 2024-01-29 PROCEDURE — 3079F DIAST BP 80-89 MM HG: CPT | Mod: CPTII,S$GLB,, | Performed by: FAMILY MEDICINE

## 2024-01-29 PROCEDURE — 80053 COMPREHEN METABOLIC PANEL: CPT | Performed by: FAMILY MEDICINE

## 2024-01-29 PROCEDURE — 84443 ASSAY THYROID STIM HORMONE: CPT | Performed by: FAMILY MEDICINE

## 2024-01-29 PROCEDURE — 1160F RVW MEDS BY RX/DR IN RCRD: CPT | Mod: CPTII,S$GLB,, | Performed by: FAMILY MEDICINE

## 2024-01-29 PROCEDURE — 99999 PR PBB SHADOW E&M-EST. PATIENT-LVL IV: CPT | Mod: PBBFAC,,, | Performed by: FAMILY MEDICINE

## 2024-01-29 PROCEDURE — 1159F MED LIST DOCD IN RCRD: CPT | Mod: CPTII,S$GLB,, | Performed by: FAMILY MEDICINE

## 2024-01-29 PROCEDURE — 83525 ASSAY OF INSULIN: CPT | Performed by: FAMILY MEDICINE

## 2024-01-29 PROCEDURE — 36415 COLL VENOUS BLD VENIPUNCTURE: CPT | Performed by: FAMILY MEDICINE

## 2024-01-29 PROCEDURE — 3008F BODY MASS INDEX DOCD: CPT | Mod: CPTII,S$GLB,, | Performed by: FAMILY MEDICINE

## 2024-01-29 PROCEDURE — 80061 LIPID PANEL: CPT | Performed by: FAMILY MEDICINE

## 2024-01-29 PROCEDURE — 83036 HEMOGLOBIN GLYCOSYLATED A1C: CPT | Performed by: FAMILY MEDICINE

## 2024-01-29 PROCEDURE — 85025 COMPLETE CBC W/AUTO DIFF WBC: CPT | Performed by: FAMILY MEDICINE

## 2024-01-29 NOTE — PROGRESS NOTES
Subjective   Referring MD:  PCP:   BMI noted  Diet:  Exercise/Activity:  Sleep:  Stressors:   Anxiety/Depression Screen/PHQ-2:     Labs Dr Briceño reviewed:   Chol 217  ? Labs with Dr. Winslow last year    Patient ID: Briana Thomas is a 38 y.o. female.    Chief Complaint: weight gain     Pt reports is at largest weight. Feels better < 180s. She was able to get in smaller size but hen had death in family and regained. Has tried calorie restriction in the past.   No meds or structured programs.     Had some depression in the past but states is resolved. Feels like has adequate support. Working 2 jobs.     Food recall:  Bfast:  Lunch:  Dinner: subway wrap, fish, tries to avoid fried  Snack:  Water: adequate  Sugar Sweetened beverages: coke, sprite  Usually 2-3/day   Tried zero sugar in the past   Etoh: limited    Often has to force self to eat, does not have bfast,  Often only has OMAD.    Pt is not on any birth control now.   On no meds for anxiety/depression in the past.   Some fatigue, sleep pattern is affected. No hx of bipolar.   Motivation is less than she would like some days.   Focus is okay while working. No hx of ADD.  Occasionally has some emotional eating.     Reviewed with pt risks/benefits/se's of metformin.   Reviewed with pt no coverage for glp1; is cost prohibitive.     Recent uti is improving     HPI       Objective     PAST MEDICAL HISTORY:  Past Medical History:   Diagnosis Date    Abnormal Pap smear of cervix     Amenorrhea     Genital herpes     Migraines     Optic neuritis     Staph infection 06/2021    Left leg         PAST SURGICAL HISTORY:  Past Surgical History:   Procedure Laterality Date    CRYOTHERAPY  05/2011    in [place]    SKIN BIOPSY  06/2021    Left Leg       FAMILY HISTORY:  Family History   Problem Relation Age of Onset    Lung cancer Paternal Grandfather     Rheum arthritis Paternal Grandmother     Rheum arthritis Maternal Grandmother     Asthma Maternal Grandmother      Hypertension Maternal Grandmother     Hypertension Maternal Grandfather     Asthma Maternal Grandfather     Hypertension Father     Hypertension Mother     Migraines Mother     Hypertension Maternal Uncle           SOCIAL HISTORY:  Social History     Social History Narrative    Not on file       MEDICATIONS:  Medications have been reviewed.    ALLERGIES:  Allergies have been reviewed.    Vitals:    01/29/24 1016   BP: 126/82   Pulse: 96   Temp: 96.1 °F (35.6 °C)     Wt Readings from Last 10 Encounters:   01/29/24 125 kg (275 lb 9.6 oz)   01/23/24 124.3 kg (274 lb)   11/08/23 126.6 kg (279 lb)   07/26/23 126.7 kg (279 lb 5.2 oz)   07/21/23 125 kg (275 lb 9.2 oz)   11/02/22 119.3 kg (263 lb)   11/01/21 114.3 kg (252 lb)       Lab Results   Component Value Date    WBC 9.94 06/24/2021    HGB 11.4 (L) 06/24/2021    HCT 37.3 06/24/2021     06/24/2021    CHOL 217 (A) 11/01/2021    TRIG 56 11/01/2021     11/01/2021    LDLDIRECT 80.59 11/01/2021    ALT 14 11/01/2021    AST 26 11/01/2021     11/01/2021    K 4.6 11/01/2021     11/01/2021    CREATININE 0.57 11/01/2021    BUN 10 11/01/2021    CO2 29 11/01/2021       Review of Systems   Constitutional:  Negative for activity change, appetite change, fatigue and fever.   HENT:  Negative for mouth dryness and goiter.    Eyes:  Negative for visual disturbance.   Respiratory:  Negative for apnea, cough, chest tightness and shortness of breath.    Cardiovascular:  Negative for chest pain, palpitations and leg swelling.   Gastrointestinal:  Negative for abdominal pain, constipation, diarrhea, nausea, vomiting and reflux.   Endocrine: Negative for cold intolerance, heat intolerance, polydipsia, polyphagia and polyuria.   Genitourinary:  Negative for frequency and menstrual problem.   Musculoskeletal:  Negative for arthralgias and myalgias.   Integumentary:  Negative for color change and rash.   Psychiatric/Behavioral:  Negative for self-injury and sleep  disturbance. The patient is not nervous/anxious.        Physical Exam  Vitals and nursing note reviewed.   Constitutional:       General: She is not in acute distress.  HENT:      Head: Normocephalic and atraumatic.      Mouth/Throat:      Pharynx: Oropharynx is clear.   Eyes:      General: No scleral icterus.     Pupils: Pupils are equal, round, and reactive to light.   Neck:      Comments: No TM  Cardiovascular:      Rate and Rhythm: Normal rate and regular rhythm.      Pulses: Normal pulses.      Heart sounds: Normal heart sounds. No murmur heard.     No friction rub. No gallop.   Pulmonary:      Effort: Pulmonary effort is normal. No respiratory distress.      Breath sounds: Normal breath sounds. No wheezing, rhonchi or rales.   Abdominal:      General: Bowel sounds are normal. There is no distension.      Palpations: Abdomen is soft.      Tenderness: There is no abdominal tenderness.   Musculoskeletal:         General: No swelling.      Cervical back: Normal range of motion and neck supple. No tenderness.   Lymphadenopathy:      Cervical: No cervical adenopathy.   Skin:     General: Skin is warm.      Capillary Refill: Capillary refill takes less than 2 seconds.      Findings: No erythema or rash.   Neurological:      Mental Status: She is alert and oriented to person, place, and time.   Psychiatric:         Mood and Affect: Mood normal.         Behavior: Behavior normal.              Assessment and Plan     1. Family history of diabetes mellitus (DM)    2. Weight gain    3. Gastroesophageal reflux disease, unspecified whether esophagitis present    4. Constipation, unspecified constipation type    5. Optic neuritis, left    6. History of migraine    7. Class 3 severe obesity due to excess calories with serious comorbidity and body mass index (BMI) of 45.0 to 49.9 in adult      Family history of diabetes mellitus (DM)  -     Insulin, Random; Future; Expected date: 01/29/2024  -     Hemoglobin A1C; Future;  Expected date: 01/29/2024    Weight gain  -     Ambulatory referral/consult to Lifestyle and Wellness  -     Ambulatory Referral/Consult to Lifestyle Nutrition; Future; Expected date: 02/05/2024  -     Lipid Panel; Future; Expected date: 01/29/2024  -     TSH; Future; Expected date: 01/29/2024  -     Insulin, Random; Future; Expected date: 01/29/2024  -     Hemoglobin A1C; Future; Expected date: 01/29/2024  -     Comprehensive Metabolic Panel; Future; Expected date: 01/29/2024  -     CBC Auto Differential; Future; Expected date: 01/29/2024    Gastroesophageal reflux disease, unspecified whether esophagitis present  Comments:  chronic/stable  Orders:  -     Ambulatory Referral/Consult to Lifestyle Nutrition; Future; Expected date: 02/05/2024    Constipation, unspecified constipation type  Comments:  chronic/stable    Optic neuritis, left  Comments:  keep optho f/u    History of migraine  Comments:  has had MRI  uses otc excedrin    Class 3 severe obesity due to excess calories with serious comorbidity and body mass index (BMI) of 45.0 to 49.9 in adult  -     Ambulatory Referral/Consult to Lifestyle Nutrition; Future; Expected date: 02/05/2024    Reviewed with pt specific strategies for adding some food during day, avoidance of symptomatic  hypoglycemia  Would also like her to see pcp soon         Inbody: interpreted and dw pt  Reviewed with pt metformin and low dose of wellbutrin  Needs dietitian referral;   CI to topamax since not on birth control  Not eating so would not automatically consider phentermine    Inbody:   Smm 71   Pbf 53  Bmr 1620  Visc fat 27  Waist:     Follow up in about 8 weeks (around 3/25/2024), or if symptoms worsen or fail to improve.

## 2024-01-31 ENCOUNTER — PATIENT MESSAGE (OUTPATIENT)
Dept: PRIMARY CARE CLINIC | Facility: CLINIC | Age: 39
End: 2024-01-31
Payer: COMMERCIAL

## 2024-02-07 ENCOUNTER — NUTRITION (OUTPATIENT)
Dept: PRIMARY CARE CLINIC | Facility: CLINIC | Age: 39
End: 2024-02-07
Payer: COMMERCIAL

## 2024-02-07 DIAGNOSIS — R63.5 WEIGHT GAIN: ICD-10-CM

## 2024-02-07 DIAGNOSIS — E66.01 CLASS 3 SEVERE OBESITY DUE TO EXCESS CALORIES WITH SERIOUS COMORBIDITY AND BODY MASS INDEX (BMI) OF 45.0 TO 49.9 IN ADULT: ICD-10-CM

## 2024-02-07 DIAGNOSIS — K21.9 GASTROESOPHAGEAL REFLUX DISEASE, UNSPECIFIED WHETHER ESOPHAGITIS PRESENT: ICD-10-CM

## 2024-02-07 PROCEDURE — 99999 PR PBB SHADOW E&M-EST. PATIENT-LVL II: CPT | Mod: PBBFAC,,, | Performed by: DIETITIAN, REGISTERED

## 2024-02-07 PROCEDURE — 97802 MEDICAL NUTRITION INDIV IN: CPT | Mod: S$GLB,,, | Performed by: DIETITIAN, REGISTERED

## 2024-02-07 NOTE — PROGRESS NOTES
"Nutrition Assessment    Visit Type: Insurance initial  Session Time:  1 Hour 30 Minutes  Reason for MNT visit: Pt in for education and nutrition counseling regarding Obesity.     Age: 38 y.o.  Wt:   Wt Readings from Last 10 Encounters:   01/29/24 125 kg (275 lb 9.6 oz)   01/23/24 124.3 kg (274 lb)   11/08/23 126.6 kg (279 lb)   07/26/23 126.7 kg (279 lb 5.2 oz)   07/21/23 125 kg (275 lb 9.2 oz)   11/02/22 119.3 kg (263 lb)   11/01/21 114.3 kg (252 lb)     Ht:   Ht Readings from Last 1 Encounters:   01/29/24 5' 3.5" (1.613 m)     BMI:   BMI Readings from Last 5 Encounters:   01/29/24 48.05 kg/m²   01/23/24 47.03 kg/m²   11/08/23 47.89 kg/m²   07/26/23 47.95 kg/m²   07/21/23 47.30 kg/m²       Client states:  Doesn't have much of an appetite. Often eats very late at night 11 pm. In grad school online, works part time job works 3-4 days and full time job 5 days during the week. Has weekends off. Often does not feel hungry or ignores hunger hormones. Often skips meals d/t being tired or just busy at work. Is interested in weight lifting but does not always feel comfortable with movements. Encouraged her to reach out to a  to teach her basic movements and how to write a program for herself.   InBody done 1/29/24: SMM:71 Pbf: 53 Visc Fat: 27 BMR estimate: 1620  Discussed:  Eating consistently throughout the day  Getting adequate protein + fiber at meals  Ideas for higher protein breakfasts, lunches, and easy dinners  Ways to increase hydration  Ways to add in movement  Decreasing kcal beverage intake  Meal planning    Medical History  Problem List             Resolved    Optic neuritis, left         Constipation         Gastroesophageal reflux disease         Class 3 severe obesity due to excess calories with serious comorbidity and body mass index (BMI) of 45.0 to 49.9 in adult            Past Medical History:   Diagnosis Date    Abnormal Pap smear of cervix     Amenorrhea     Genital herpes     Migraines  "    Optic neuritis     Staph infection 06/2021    Left leg       Past Surgical History:   Procedure Laterality Date    CRYOTHERAPY  05/2011    in [place]    SKIN BIOPSY  06/2021    Left Leg        Medications    Prior to Admission medications    Medication Sig Start Date End Date Taking? Authorizing Provider   cetirizine HCl/pseudoephedrine (ZYRTEC-D ORAL)     Provider, Historical   sulfamethoxazole-trimethoprim 800-160mg (BACTRIM DS) 800-160 mg Tab 1 Tablet Orally 2 times a day for 7 days 1/15/24   Provider, Historical   terbinafine HCL (LAMISIL) 250 mg tablet Take one tablet by mouth daily for four weeks 1/15/24   Provider, Historical   valACYclovir (VALTREX) 500 MG tablet TAKE 1 TABLET(500 MG) BY MOUTH TWICE DAILY FOR 3 DAYS 8/8/22   Erica Briceño MD        Vitamins, Minerals, and/or Supplements:  Iron // Vitamin D // Vitamin C // Elderberry // Fish Oil // Berhane's Wort     Social History    Marital status:  Single    Social History     Tobacco Use    Smoking status: Never    Smokeless tobacco: Never   Substance Use Topics    Alcohol use: Not Currently     Comment: Sparingly       Labs   Reviewed and noted    LIFESTYLE FACTORS    Sleep: fair  Wake: 7-8 am  Sleep: 130 am - 3 am     Stress Level: high  Stress management: not discussed    Support System:   not discussed    Physical Activity: lightly active (low intensity, 1-3 days a week)  Types of activity: has active job, interested in lifting weights but does not currently do them    Dining out: mostly take out  Types of restaurants/foods: [employer]    Frequency of consumption of fried foods: weekly // does try to avoid    Meal preparation/shopping: self    Food Allergies or Intolerances:  lactose intolerant // banana makes tongue itchy     Beverages  Water: lately has been drinking water, not adequate amounts  Alcohol: none  Coffee: none  Energy Drinks: used to  Tea: used to  Soda: Coke Zero // Sprite // Coke  Milk: almond or soy  Juice: veggie pressed  juices    24-hour Recall  One Meal a Day usually  [employer]: tries to avoid fried // chicken or fish fries + Coke // hamburgers  Cook at home: breakfast type meals // tuna salad (lite philip, celery, onion, pepper) on whole wheat or with whole grain crackers // turkey sandwich + pepper jack cheese + lite philip + shruthi balwinder delightful bread  Snacks: white cheddar popcorn // candy   Diagnosis    Inadequate protein intake related to skipping meals and snacks as evidenced by 24-hour food recall.    Intervention    Estimated Energy Requirements:   Adj. BW: 82 kg  BMR x 1.2 = 2291 - 500  Calories: 1800  Protein: 135 grams   Carbohydrates: 180 grams   Fat: 60 grams   Baseline for fluids: 100 ounces + sweat loss    Written Materials Provided  Healthy Eating Plate  RD contact information    Goal #1: Go to sleep earlier so can make/eat breakfast  Goal #2: Prep lunch for the next day the night before  Goal #3: 3 days a week eat at least 1 serving of veggies    Monitoring/Evaluation    Monitor the following:  Weight  Sleep  Stress Management  Movement    Communicated with healthcare provider and documented plan for referral to appropriate agency/healthcare provider as needed    Supervising Physician: Mindi Malloy MD    Patient motivation, anticipated barriers, expected compliance: Patient is motivated and has verbalized understanding and intent to comply.     Comprehension: good     Follow-up: PRN

## 2024-02-07 NOTE — PATIENT INSTRUCTIONS
Name: Briana Thomas  Date: 02/07/2024  NUTRITION RECOMMENDATIONS    Action Items:  Goal #1: Go to sleep earlier so can make/eat breakfast  Goal #2: Prep lunch for the next day the night before  Goal #3: 3 days a week eat at least 1 serving of veggies    Additional Recommendations  Hydration: At least ½ your body weight in ounces per day  To get adequate hydration:  Carry an emotional support water bottle with you everywhere.  Try drinking out of a straw! We tend to drink more naturally when we drink out of a straw.  Take a sip or a gulp every 20-30 minutes.  ALL fluid (except alcohol) counts.   Try to prioritize water and drinks that have <10 calories per serving  Zero sugar or Diet sodas // Crystal Light // Gatorade or Powerade Zero // Body [place] Light // 5 Calorie Juices  Limit any pressed juices to no more than 4 ounces at one time and have them with a meal if possible  For smoothies:  Look for ones that are 400-600 calories for a meal or <200 calories for a snack or if you want to have a sandwich with it  Make sure to add pea protein powder or order one that lists protein powder in the ingredients  Smoothie  has a line of high protein smoothies under their Get Fit section  You will need to add protein powder to all Tropical Smoothie options  Ways to sneak in extra vegetables:  Try a 50/50 mixture of cauliflower rice and regular rice  Mix zucchini noodles or any chopped vegetables with pasta  Make a smoothie at home and add a handful of baby spinach, or frozen cauliflower rice  Add a side salad  Dip them in light ranch, BBQ sauce, or hummus  Lean Protein Sources:  Chicken breast  Boneless, skinless chicken thighs  Eggs or egg whites/beaters  Pork tenderloin  93/7 ground beef/turkey/chicken  Sirloin, flank, filet, eye of round steak  Center cut montesinos  Pork chop, thin cut, boneless  Beef jerky  Deli meat (chicken, ham, turkey)  Lorane  Catfish  Tuna, canned in water  Oysters   Crab, shrimp, crawfish  Low-fat  dairy, any kind  30 grams of protein cheat sheet:  3.5 ounces cooked chicken breast // 172 calories  4 ounces ground turkey breast // 120 calories  5 ounces [place] strip steak // 325 calories  5 ounces shrimp // 170 calories  6 ounces cod // 145 calories  5 ounces tuna fish // 165 calories  5 ounces salmon // 300 calories  8 ounces extra firm tofu // 250 calories  1.5 scoops protein powder // 160 calories  1.5 cups edamame // 280 calories  2 cups black beans // 485 calories  5 eggs // 390 calories  1.25 cups egg whites or egg beaters // 155 calories  When looking for a lean protein look for protein > fat on the nutrition label if fat > protein it is considered a high fat meat and should be limited during times of weight loss  Non-Starchy Vegetables include all vegetables except:  Potatoes / sweet potatoes  Corn  Peas  [place] beans  Winter squash such as acorn squash  High Fiber Carbohydrate Sources:  Beans / lentils  Whole grains  Potatoes with skin on   Quinoa  Rice  Peas  Corn  [place] beans  Carb balance tortillas or flatout wraps  All fruits  Oatmeal   High Fiber Foods  Raspberries, 1 cup, 8 grams  Pear, 1 medium, 5.5 grams  Apple with skin, 1 medium, 4.5  grams   Green peas, 1 cup, 9 grams   Broccoli, 1 cup chopped, 5 grams   Silver Star sprouts, 1 cup, 4 grams   Whole wheat pasta, 1 cup 6 grams   Quinoa cooked, 1 cup 5 grams   Popcorn, plain 3 cups, 3.5 grams   Lentils, 1 cup. 15.5 grams   Shaheed seeds, 1 tablespoons ,3 grams   Almonds, 1 ounce, 3.5 grams   Black beans, 1/2 cup, 7.5 grams   Jessica Tomás Delightful bread, 2 slices, 5 grams   Carb balance tortillas, 1 soft taco sized, 15 grams   Minimum goal is 21-25 grams of fiber per day for women  Frozen Meal Guidelines:  <400 calories  20g or greater protein  Top with rotisserie chicken for extra protein  Pair with additional fiber on the side to make it filling  Piece of fruit, ½ a salad kit, bag of frozen vegetables  Exercise linden and website recommendations:  Street Parking  - have an iPhone linden or can use website, typically <30 minutes and higher intensity, can do at gym or at home, can do On Ramp program for free via their YouTube page   [employer]  - linden  Jj [employer]  - linden traditional weightlifting programming  Moves  - linden  [employer]  - linden  YouTube:  [facility] [employer]   The Studio by Aidan Ortega   Yoga with Tiffanie Yeh   Body Project   Move with Inez        Macronutrient Servings Totals    Calories Protein Carb Fat   1700 to 1800 Grams Grams Grams    120 to 135 160 to 180 55 to 65     Breakfast ideas:  300-500 calories, at least 20g protein  Idea #1:  375 calories, 20g protein, 50g carbs, 12g fat  2/3 cup Uatsdin Protein Granola  1 cup Vanilla Unsweetened Soy Milk  5 large strawberries  Idea #2:  406 calories, 30g protein, 22g carbs, 22g fat  Yogurt Parfait  Chobani Zero Sugar Greek yogurt (it is lactose free)  1 cup or individual container peaches canned in water or 100% juice  2 tbsp. chopped pecans  3 Butterball [place] Sausage Links   Idea #3:  High Protein Oatmeal  High Protein Overnight Oats    Nutrition for plain recipe per servin calories, 38g protein, 40g carbohydrate, 9g fat  Customizable High Protein Baked Oatmeal  Nutrition for plain recipe per servin kcal, 20g protein, 38g carbs, 9g fat   Idea #4:  345 calories, 35g protein, 24g carbohydrate, 10g fat  Basic Protein Smoothie  1 scoop protein powder  1 cup frozen fruit  ½ cup frozen cauliflower rice  1 cup unsweetened soy milk  1 tablespoon tara seeds  Optional add-ins: peanut butter or PB2, ground flax seed, coconut flakes, sugar-free pudding mix, handful baby spinach, ½ cup pickled beets  Idea #5:  386 calories, 23g protein, 35g carbohydrate, 16g fat  Breakfast [place]  Whole wheat English muffin  1 egg  2 slices center cut montesinos or ham or turkey deli meat  1 slice [place] Ultra-Thin Sliced Gabriele Gurmeet  1 cup cantaloupe    Idea #6:  370 calories,  25g protein, 27g carbs, 18.5g fat  2 Egg White Muffins   2 slices Jessica Tomás Delightful Honey Whole Wheat  1 portion container smashed avocado / guacamole      Lunch Ideas  Idea #1:  varies  Any salad kit + packet or small can of tuna/salmon or 4-5 ounces of rotisserie chicken (skin pulled off)  Building A Better-For-You Salad   Start with a base of fresh greens: arugula, spinach, butter lettuce, mixed greens, kale, toño  Add at least 2-3 veggies  Raw veggies add a nice crunch  Grilled or roasted veggies add a nice charred, caramelized flavor  Pickled veggies are great for adding a hint of sweet/sour flavor, while fermented veggies give you added probiotic benefits  Add a lean protein (3-4 ounces)  Grilled chicken, turkey, tuna/salmon packet, shrimp, tofu  Add high fiber carbs (~ ½ cup)  Beans & quinoa   Grains: whole grain, chickpea pasta, farro, bulgar  Starchy veggies: potatoes, winter squash, corn  Fruit: berries, grapes, chopped apples, etc.  Add texture + flavor: choose 1-2, up to ~ 2 tablespoons of each item  Avocado  Cheese  Hummus   Nuts   seeds  Dress it up  Choose light/low-fat options: Larose's Own Light dressings and [place] are great options  Make your own: oil + vinegar + seasonings + a little bit of Sergio mustard  Homemade Salad Dressing Recipes    Idea #2:  415 calories, 25g protein, 30g carbohydrate, 16g fat  Build a better sandwich:   2 slices whole grain bread  3-4 ounces deli meat  1 sliced reduced-fat cheese or ¼ sliced avocado or 1-2 slices [place] Ultra Thin Sliced Cheese  unlimited non-starchy vegetables such as lettuce, tomato, onion, pickle, etc.  1 tablespoons sauce (olive oil philip, [place] Ranch, lite honey mustard, No Sugar Added BBQ sauce, hummus), unlimited yellow or brown mustard  Side items options:  1 cup of raw non-starchy vegetables (baby carrots, sugar snap peas, cucumbers, etc.) + 1-2 tbsp hummus/tzatziki/light ranch/BBQ  ½ cup cooked non-starchy vegetables  1  serving baked chips such as Pop Corners, or Pop Chips  1 piece fruit  Idea #3:  495 calories, 24g protein, 48g carbohydrate, 23g fat  Street Corn Chicken Salad serve on lettuce wraps or in a carb balance tortilla  Idea #4:  Snack Box Lunch  Option 1:  382 calories, 34g protein, 30g carbohydrate, 14g fat  2 boiled eggs  2 ounces deli meat  ½ bell pepper  11 mini pretzels  ½ cup grapes  Light string cheese  Option 2:   386 calories, 27g protein, 47g carbohydrate, 10g fat  1 serving Nut Thins  Light string cheese  Tuna pack, any flavor  1 cup chopped strawberries  Fiber one brownie bar  ½ cup blueberries  Option 3:  403 calories, 32g protein, 44g carbohydrate, 11g fat  1 ounce pretzel crisps  ½ cup apple sliced  2 slices ultra thin provolone cheese  2 ounces rotisserie chicken  4 tbsp powdered peanut butter mixed with water (see directions)  ½ cup blackberries  Option 4:   446 calories, 36g protein, 26g carbohydrate, 22g fat  2 tbsp hummus  3 ounces baby carrots  2 hard boiled eggs  ½ cup grapes  2 ounces deli turkey wrapped around 2 low fat string cheese  Idea #5:  400 calories, 27g protein, 54g carbs, 9g fat  Tuna Salad  1 5-ounce can chunk light tuna in water  2 tbsp light philip   Chopped celery & onion  Salt, pepper, lemon juice to taste  Serve with 11 Triscuit Thin Crisps - any flavor variety or Nut Thins  1 medium apple + 2 tbsp brownie batter hummus    Dinner Ideas  Idea #1:  Varies: ~ 400 calories, 31-49g protein, 35g carbohydrate, 10g fat  3-5 ounces of baked or broiled fish, seafood, or lean meat cooked with spray olive oil  1 cup or more of cooked non-starchy vegetables  About ½ - 1 cup of carbohydrates such as: rice, beans, peas, corn, potatoes, pasta  Idea #2:  Varies: made with 1 cup quinoa, 4 ounce sirloin steak, 1 cup chopped broccoli, and 1 tablespoon Lemon Basil Vinaigrette 500 calories, 46g protein, 42g carbohydrate, 16g fat  Build a grain/power bowl:   Choose a starch (1/2-2/3 cup feel free to  mix/match)  Brown rice, farro, quinoa, millet, barley, couscous  White or sweet potatoes  [place] squash  Choose a protein (3-4 ounces)  [place], steak (lean cut), chicken, shrimp, edamame, tofu  Vegetables (unlimited amounts)  Try them roasted, steamed, or grilled for variety  Eggplant, broccoli, carrots, bell peppers, green beans, radishes, cucumbers, parsnips, lettuce, cabbage  Dressing/sauce  Vinaigrette, yogurt, warm broth, hot sauce, soy sauce, chimichurri, pesto, or any combinations!  Garnish (optional)  Fresh herbs, micro greens, nuts, seeds, toasted coconut  Note that nuts/seeds/coconut contain a lot of calories and should be used sparingly  Idea #3:  433 calories, 42g protein, 10g carbohydrate, 25g fat  [place]  1-2 carb balance tortillas  ¼ cup cheese of choice  2 tbsp sour cream  3-4 ounces lean (93/7) ground beef or turkey or rotisserie chicken  Idea #4:  412 calories, 35g protein, 41g carbohydrate, 12g fat  Better-for-you pizza  2-Ingredient High Protein Dough, + ¼ cup red sauce + 1/3 cup cheese + 2-4 ounces of protein (rotisserie chicken, turkey pepperoni, sliced grilled chicken/steak) + veggies of choice  Serve with at least 1 cup of non-starchy vegetables or side salad  Idea #5:  Varies, made with chicken sausage and marinara and steamed broccoli  433 calories, 35g protein, 44g carbohydrate, 13g fat  Better-for-you pasta  1 cup cooked Barilla Protein+ pasta + 2-4 ounces lean protein + ¼ cup nataliya sauce or ½ cup red sauce or 1-2 tablespoons pesto   Serve with 1-2 cups non-starchy vegetables    Snack Ideas  Create a hunger crushing combo with protein, fiber, and fat! Choose two of the three to create a satisfying and filling snack any time of the day!  Chobani Zero Sugar Yogurt + piece of fruit  1 portion bag of nuts or 1/4 cup nuts + piece of fruit  Piece of fruit + 1 tablespoons no sugar added nut butter  Protein bar  Look for at least 15 grams of protein  Limit to 1 serving carbohydrates (15  grams)  Ready to drink protein shake   At least 25 grams protein  Bag of Skinny Pop or Smart Food popcorn + 1 low-fat cheese stick/babybel   Turkey rollup: 1 carb balance tortilla + 3 oz turkey + 1 tablespoon lite philip  3 oz can or packet of tuna + 1 tablespoon lite philip with 1 serving of crackers of your choice  Bare Apple Chips + 2 low-fat string cheese  1-2 cups raw vegetables + 1 portion container guacamole or 2-3 tbsp hummus  1-2 Chomps Sticks + 1 piece fruit  Sweet Treats  Protein shake  ½ cup berries + a dollop of whipped cream  Outshine Frozen Fruit Bars (2-3 if wanted)  Chocolate Dipped Banana Bites  Raspberry Lemon Greek Frozen Yogurt Bark  1 cup strawberries with 2-3 tbsp chocolate hummus  Any smoothie under 200 calories  Sugar Free Pudding or Jello  5.3 ounce 0% Fage yogurt mixed with 1-2 tablespoons sugar-free pudding mix  All the Best Banana Nice Cream Recipes   Any sweet treat under 200 calories (think mini/fun-sized)    Favorite Websites for Recipe Inspiration  Skinnytaste  Real Food Dietitians   Eating Well  Budget Bytes  Eating Bird Food  Fit Foodie Finds  Oh Snap Macros   35 Macro Friendly Meal Prep Recipes   9 Meal Prep Success Tips       Additional Recipe Ideas:  Breakfast  Oats Overnight + 6 ounces Fairlife milk   Breakfast Toast Ideas + Protein Shake  Meal Prep Breakfast Options:  [place] Chicken Egg Cups  Ham & Broccoli Breakfast Casserole  Bagel Ham and Cheese Quiche  Blueberry Protein Pancakes  English Muffin Breakfast Bake     Raspberry Fiber Smoothie (makes 1 smoothie)  1 cup frozen raspberries  1 cup unsweetened almond milk  2/3 cup nonfat vanilla Greek yogurt (or 5.3 oz cup) or 1 scoop protein powder  ½ cup frozen cauliflower (florets or riced)  1 tbsp almond butter  1 tbsp old fashioned oats  1 tbsp tara seeds  ½ tbsp honey  PB&J Smoothie Bowl  ¼ cup milk of your choice  2/3 cup frozen blueberries  2/3 cup sliced strawberries, frozen  1 scoop vanilla protein powder  1 tbsp peanut  butter  Optional toppings: 1 tbsp melted peanut butter, 1-2 tbsp granola, tara seeds, blue berries  Spinach Avocado Smoothie  1 cup nonfat plain Greek yogurt  1 cup fresh spinach  1 frozen banana  ¼ avocado  2 tbsp water  1 tsp honey  Lunch/Dinner  Chicken Enchiladas - great for freezing serving with a side salad or at least 1 cup non-starchy vegetables  Ground Turkey Skillet with Zucchini, Corn, Black Beans, and Tomato great by itself or serve over 1 cup cauliflower rice or 1/3 cup brown rice    Air Fryer Chicken Thighs with Air Fryer Frozen Broccoli & 1 cup cubed roasted potatoes  BBQ Grilled chicken breast with Brown Sugar Baked Beans + 1 cup non-starchy vegetables   Juicy Chicken Breast 6 Ways   30 Sheet Pan Dinners    Chicken Fajita Kabobs serve with brown rice, quinoa, or carb smart tortillas + additional non-starchy vegetables  Pasta Primavera add in additional protein such as shrimp, sliced steak, ground beef, or rotisserie chicken    Sheet Pan Turkey Meatloaf + Broccoli serve with 1 whole baked potato and ¼ cup reduced fat cheese  Ranch Chicken Meal Prep  Calorie Swaps  Sweet Baby Rays BBQ sauce --> Sweet Baby Rays Zero Sugar BBQ Sauce  Greek Gods Honey Vanilla Greek Yogurt --> [place] Triple Zero Vanilla Greek Yogurt  Ice cream [place] --> Skinny Cow No Sugar Added Ice Cream Bar  Butter --> [place] Butter with Light Butter  Rice --> 50/50 mixture of rice & cauliflower rice  Cream Cheese --> Whipped Cream Cheese  Sliced cheese --> ultra-thin sliced cheese  [place]/Sour Cream --> Fage 0% Greek Yogurt  Syrup --> Lite version  Strawberry jam --> sugar free preserves  Tortillas --> corn tortillas or carb smart tortillas  Ranch --> [place] Ranch  Vinaigrettes --> Adri's Dressing  Coffee syrups --> Sugar free options  Pasta --> Spaghetti Squash  [place] --> avocado oil childress  Avocado --> portion control smashed avocado cups  Restaurant Tips   Pick 1 out of the 4 Rule: Instead of eating bread/tortilla chips, an  appetizer, alcoholic drink and dessert, choose just one to have with your entrée   Focus on lean proteins: Refer to lean meat/meat substitutes page in meal planning guidebook. Select items grilled, baked, broiled, braised, poached or roasted   For your heart health, avoid crispy, crunchy, breaded, paneed or stuffed items and items that are cream based, au gratin or buttered   Order sauces, dressings, and gravies on the side. This way you can add 1-2 tablespoons yourself. This helps with portion control    Request extra non-starchy vegetables instead of a starchy side dish. If the starchy side is something you love, consider splitting it with someone else at the table   Beverages: Order water with lemon, sparkling water, or unsweetened tea. Avoid sugary soft drinks, juices and mixers   Deep fried foods: Enjoy no more than 2x/month    Favorite Brands  Protein Bars/Shakes/Powders  Bars  RX Bars  Fit Crunch  THINK  Quest  One  Shakes  Core Power Shakes (26g or 42g protein)  Fairlife Nutrition Plan (30g protein)   Ensure Max Protein (30g protein)  Premier Protein Shakes (30g protein)  Boost High Protein (20g protein)   Muscle Milk Genuine Protein Shakes (25g protein)   Quest Protein Shakes (30g protein)   Orgain Protein Shakes (20-26g protein)   Powders  Ghost  Optimum Nutrition   Muscle Milk  Garden of Life  Pérez   Naked   Pasta/Rice  Banza Chickpea Pasta  Barilla Whole Wheat  Paris Rice  Success Boil in Bag Brown Rice  Bread  Luciano's Killer Bread thin sliced  Nature's Own 100% Whole Grain Sugar-Free  [place] Whole Wheat Small Slice  Jessica Tomás Delightful White Whole Wheat  [place] Carb Balance Tortillas  Ole Extreme Wellness Tortillas  Flatout Wraps  [place] Carb Counter Tortillas  T[place] Keto Wraps  Dairy  Chobani less sugar  Dannon Light and Fit Yogurt  Fage 2% plain  [place] Triple Zero  Fairlife 2% Milk  Ripple Milk, unsweetened(milk alternative)  Soy Milk, Vanilla Unsweetened  Good Culture Low-Fat  Cottage Cheese  Dressings/Sauces  B[place] Greek Yogurt Ranch   Sweet Baby Ray's BBQ Sauce - no added sugar  Any ketchup with no added/less added sugar  Varun or Pastor or Primal Kitchen  G Smith Sugar Free Sauces   Thuan's Own Dressings   Adri's Dressings   Cereal  Kashi GO!  Ratio  Shredded Wheat  Fiber One  Grape Nuts  Special K Protein  Snacks  Nature's Garden Trail Mix Snack Packs  Pop Chips (like healthier Lays)  Pop Corners (like healthier Doritos)  Skinny Pop Popcorn  Lesser Evil Popcorn  Beanitos Chips  Shinto Oats Rice Crisps   Late July Shaheed & Quinoa Tortilla Chips  Great Value Zero Sugar Beef Jerky (be mindful of sodium)  Chomps Meat Sticks        [facility] Eat Fit    Designed to take the guesswork out of dining out healthfully, [facility] Eat Fit makes the healthy choice the easy choice   Visit [email or web address]    Download the [facility] Eat Fit linden for free on your smartphone   Lists of all Eat Fit restaurant partners & dishes by location   Nutrition facts included for all Eat Fit dishes   200+ Eat Fit approved recipes   Eat Fit shopping guide + community wellness resources   Eat Fit Cookbook   Craft: The Eat Fit Guide to Zero Proof Cocktails                K.I.S.S. (Keep It Simple Silly)  Pick 2-3 non-starchy vegetables  Rotate during the week having them for lunches and dinners.  Pick 1-2 different types of meats  Use different sauces   BBQ  [place]  Teriyaki  Pesto  Salsa  Seasonings  Slap Ya Mama  Lemon Pepper  Everything but the Bagel  Ostrander's Chipotle Cherry  Pick 2 different starches to have during the week  For example:  Potatoes  sweet potatoes  Corn  butternut squash  Banza chickpea pasta  brown rice  Keep breakfast simple, have two options and stick with those two options the whole week  For example, making Overnight Oats with a serving of fruit for 3 breakfasts and eat 2 Healthy Egg Muffin Cups with a slice of whole grain toast and 1 serving fruit for the other 4 breakfasts    For  lunch/dinner you could do something simple like the following: This would allow you to not waste a lot of ingredients but have a wide variety of flavors that you could mix match all week.  Chicken thighs 3 ways:   BBQ Sauce  Lemon Pepper Seasoning  Ranch Seasoning  Potatoes 2 ways:   cubed and roasted  mashed with low-fat milk, Greek yogurt (instead of butter or sour cream) and low-fat cheese  Brown rice - made in chicken stock, low sodium (gives it extra flavor)  Frozen Broccoli with ranch seasoning, microwaved  Frozen broccoli roasted, sprinkled with lemon juice and 1 tbsp parmesan  Green beans with sliced mushrooms  Green beans sauteed with garlic  15-Minute Meals  Two slices whole wheat bread + frozen turkey burger + microwave broccoli  Chickpea pasta + jar tomato sauce + chicken sausage + side salad kit  Microwave rice + rotisserie chicken + frozen veggie mix + teriyaki sauce + chopped peanuts  [place] grilled chicken rotisserie or blackened grilled chicken tenders + frozen garlic bread + baby carrots + hummus  Stir Salcedo Chicken - make with Banza pasta + add in double vegetables   Shrimp + BBQ sauce + canned corn + canned beans + microwave vegetables  Can tuna/salmon/chicken + 2 tablespoons Greek yogurt/avocado oil philip + 1 serving Nut Thins + apple + cheese stick

## 2024-03-04 ENCOUNTER — OFFICE VISIT (OUTPATIENT)
Dept: PRIMARY CARE CLINIC | Facility: CLINIC | Age: 39
End: 2024-03-04
Payer: COMMERCIAL

## 2024-03-04 VITALS
SYSTOLIC BLOOD PRESSURE: 124 MMHG | HEART RATE: 71 BPM | TEMPERATURE: 98 F | OXYGEN SATURATION: 99 % | DIASTOLIC BLOOD PRESSURE: 80 MMHG | WEIGHT: 281.31 LBS | BODY MASS INDEX: 48.03 KG/M2 | HEIGHT: 64 IN

## 2024-03-04 DIAGNOSIS — F41.9 ANXIETY AND DEPRESSION: ICD-10-CM

## 2024-03-04 DIAGNOSIS — K59.00 CONSTIPATION, UNSPECIFIED CONSTIPATION TYPE: ICD-10-CM

## 2024-03-04 DIAGNOSIS — E66.01 CLASS 3 SEVERE OBESITY DUE TO EXCESS CALORIES WITH SERIOUS COMORBIDITY AND BODY MASS INDEX (BMI) OF 45.0 TO 49.9 IN ADULT: ICD-10-CM

## 2024-03-04 DIAGNOSIS — R63.5 WEIGHT GAIN: Primary | ICD-10-CM

## 2024-03-04 DIAGNOSIS — K21.9 GASTROESOPHAGEAL REFLUX DISEASE, UNSPECIFIED WHETHER ESOPHAGITIS PRESENT: ICD-10-CM

## 2024-03-04 DIAGNOSIS — F32.A ANXIETY AND DEPRESSION: ICD-10-CM

## 2024-03-04 PROCEDURE — 99214 OFFICE O/P EST MOD 30 MIN: CPT | Mod: S$GLB,,, | Performed by: FAMILY MEDICINE

## 2024-03-04 PROCEDURE — 1159F MED LIST DOCD IN RCRD: CPT | Mod: CPTII,S$GLB,, | Performed by: FAMILY MEDICINE

## 2024-03-04 PROCEDURE — 1160F RVW MEDS BY RX/DR IN RCRD: CPT | Mod: CPTII,S$GLB,, | Performed by: FAMILY MEDICINE

## 2024-03-04 PROCEDURE — 3044F HG A1C LEVEL LT 7.0%: CPT | Mod: CPTII,S$GLB,, | Performed by: FAMILY MEDICINE

## 2024-03-04 PROCEDURE — 3079F DIAST BP 80-89 MM HG: CPT | Mod: CPTII,S$GLB,, | Performed by: FAMILY MEDICINE

## 2024-03-04 PROCEDURE — 3074F SYST BP LT 130 MM HG: CPT | Mod: CPTII,S$GLB,, | Performed by: FAMILY MEDICINE

## 2024-03-04 PROCEDURE — 99999 PR PBB SHADOW E&M-EST. PATIENT-LVL IV: CPT | Mod: PBBFAC,,, | Performed by: FAMILY MEDICINE

## 2024-03-04 PROCEDURE — 3008F BODY MASS INDEX DOCD: CPT | Mod: CPTII,S$GLB,, | Performed by: FAMILY MEDICINE

## 2024-03-04 RX ORDER — BUPROPION HYDROCHLORIDE 100 MG/1
100 TABLET, EXTENDED RELEASE ORAL DAILY
Qty: 30 TABLET | Refills: 0 | Status: SHIPPED | OUTPATIENT
Start: 2024-03-04 | End: 2024-05-13

## 2024-03-04 NOTE — PATIENT INSTRUCTIONS
Please let me know if you have any issues at all.   Please call today for appointment     Please make a primary care appointment as soon as possible.  Please let me know if you have any issues with medication.
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
## 2024-03-19 ENCOUNTER — OFFICE VISIT (OUTPATIENT)
Dept: OPHTHALMOLOGY | Facility: CLINIC | Age: 39
End: 2024-03-19
Payer: COMMERCIAL

## 2024-03-19 DIAGNOSIS — H10.11 ALLERGIC CONJUNCTIVITIS OF RIGHT EYE: Primary | ICD-10-CM

## 2024-03-19 DIAGNOSIS — H16.141 SUPERFICIAL PUNCTATE KERATITIS OF RIGHT EYE: ICD-10-CM

## 2024-03-19 PROCEDURE — 1159F MED LIST DOCD IN RCRD: CPT | Mod: CPTII,S$GLB,, | Performed by: OPTOMETRIST

## 2024-03-19 PROCEDURE — 99213 OFFICE O/P EST LOW 20 MIN: CPT | Mod: S$GLB,,, | Performed by: OPTOMETRIST

## 2024-03-19 PROCEDURE — 99999 PR PBB SHADOW E&M-EST. PATIENT-LVL II: CPT | Mod: PBBFAC,,, | Performed by: OPTOMETRIST

## 2024-03-19 PROCEDURE — 1160F RVW MEDS BY RX/DR IN RCRD: CPT | Mod: CPTII,S$GLB,, | Performed by: OPTOMETRIST

## 2024-03-19 PROCEDURE — 3044F HG A1C LEVEL LT 7.0%: CPT | Mod: CPTII,S$GLB,, | Performed by: OPTOMETRIST

## 2024-03-19 RX ORDER — LOTEPREDNOL ETABONATE 5 MG/G
1 GEL OPHTHALMIC 4 TIMES DAILY
Qty: 5 G | Refills: 0 | Status: SHIPPED | OUTPATIENT
Start: 2024-03-19 | End: 2024-03-26

## 2024-03-19 NOTE — PROGRESS NOTES
HPI     Eye Problem            Comments: Pt states pain on side of OD, when she closes her eyes she can   feel tighteness  Pain Scale: 1  Onset: 2 days ago  OD, OS, OU: Temporal side of OD   Discharge: no    A.M. Matting: no  Itch: yes  Redness: yes  Photophobia:  no  Foreign body sensation:only when rubbing eye   Deep pain: no  Previous occurrence: yes on OS  Drops:  OTC Allergy            Last edited by Glenna Solares MA on 3/19/2024  3:13 PM.            Assessment /Plan     For exam results, see Encounter Report.    Allergic conjunctivitis of right eye    Superficial punctate keratitis of right eye    Other orders  -     loteprednol etabonate (LOTEMAX) 0.5 % DrpG; Place 1 drop into the right eye 4 (four) times daily. for 7 days  Dispense: 5 g; Refill: 0    Start Lotemax qid OD x 7 days  Continue zaditor bid OU      RTC PRN if symptoms worsen or not resolved x 1 week  Otherwise, RTC 8 months for dilated exam with gOCT and dOCT   Discussed above and all questions were answered.

## 2024-05-11 DIAGNOSIS — E66.01 CLASS 3 SEVERE OBESITY DUE TO EXCESS CALORIES WITH SERIOUS COMORBIDITY AND BODY MASS INDEX (BMI) OF 45.0 TO 49.9 IN ADULT: ICD-10-CM

## 2024-05-11 DIAGNOSIS — F41.9 ANXIETY AND DEPRESSION: ICD-10-CM

## 2024-05-11 DIAGNOSIS — F32.A ANXIETY AND DEPRESSION: ICD-10-CM

## 2024-05-11 DIAGNOSIS — R63.5 WEIGHT GAIN: ICD-10-CM

## 2024-05-12 NOTE — TELEPHONE ENCOUNTER
Refill Routing Note   Medication(s) are not appropriate for processing by [facility] for the following reason(s):        Patient not seen by provider within 15 months  ED/Hospital Visit since last OV with provider  New or recently adjusted medication    [facility] action(s):  Defer               Appointments  past 12m or future 3m with PCP    Date Provider   Last Visit   3/4/2024 Mindi Malloy MD   Next Visit   Visit date not found Mindi Malloy MD   ED visits in past 90 days: 0        Note composed:1:51 AM 05/12/2024

## 2024-05-13 ENCOUNTER — TELEPHONE (OUTPATIENT)
Dept: PRIMARY CARE CLINIC | Facility: CLINIC | Age: 39
End: 2024-05-13
Payer: COMMERCIAL

## 2024-05-13 RX ORDER — BUPROPION HYDROCHLORIDE 100 MG/1
TABLET, EXTENDED RELEASE ORAL
Qty: 30 TABLET | Refills: 0 | Status: SHIPPED | OUTPATIENT
Start: 2024-05-13

## 2024-05-13 NOTE — TELEPHONE ENCOUNTER
Called pt at listed number; she stopped wellbutrin but restarted. Has been once daily.   NO se's. Denies any change in mood for neg.   She just restarted. BRADY pt will refill x 1 and set up for f/u.   Pt advised to contact office if any issues scheduling.     marina

## 2024-06-24 ENCOUNTER — PATIENT MESSAGE (OUTPATIENT)
Dept: PRIMARY CARE CLINIC | Facility: CLINIC | Age: 39
End: 2024-06-24

## 2024-06-25 ENCOUNTER — TELEPHONE (OUTPATIENT)
Dept: PRIMARY CARE CLINIC | Facility: CLINIC | Age: 39
End: 2024-06-25
Payer: COMMERCIAL

## 2024-06-25 NOTE — TELEPHONE ENCOUNTER
Called patient to offer the remaining time slots we had to reschedule her appointment and pt. Refused.

## 2024-11-26 ENCOUNTER — OFFICE VISIT (OUTPATIENT)
Dept: OPHTHALMOLOGY | Facility: CLINIC | Age: 39
End: 2024-11-26
Payer: COMMERCIAL

## 2024-11-26 DIAGNOSIS — H04.129 DRY EYE: Primary | ICD-10-CM

## 2024-11-26 PROCEDURE — 3044F HG A1C LEVEL LT 7.0%: CPT | Mod: CPTII,S$GLB,, | Performed by: OPTOMETRIST

## 2024-11-26 PROCEDURE — 99213 OFFICE O/P EST LOW 20 MIN: CPT | Mod: S$GLB,,, | Performed by: OPTOMETRIST

## 2024-11-26 PROCEDURE — 99999 PR PBB SHADOW E&M-EST. PATIENT-LVL II: CPT | Mod: PBBFAC,,, | Performed by: OPTOMETRIST

## 2024-11-26 PROCEDURE — 1159F MED LIST DOCD IN RCRD: CPT | Mod: CPTII,S$GLB,, | Performed by: OPTOMETRIST

## 2024-11-26 PROCEDURE — 1160F RVW MEDS BY RX/DR IN RCRD: CPT | Mod: CPTII,S$GLB,, | Performed by: OPTOMETRIST

## 2024-11-26 NOTE — PROGRESS NOTES
HPI     Eye Problem            Comments: Pt noticed some irritation OU. Feels like something is in it.   Used lotemax drops and it seemed to help, but not like before. VA has also   decreased in OD with and without help of glasses  Pain Scale:  0  Onset:   1.5 weeks  OD, OS, OU:   OU  Discharge:   no  A.M. Matting:  yes  Itch:   yes  Redness:   little  Photophobia:   sometimes  Foreign body sensation:   yes  Deep pain:   no  Previous occurrence:   yes  Drops:   yes           Last edited by Tatyana Easton on 11/26/2024  2:41 PM.            Assessment /Plan     For exam results, see Encounter Report.    Dry eye    Other orders  -     lifitegrast (XIIDRA) 5 % Dpet; Place 1 drop into both eyes 2 (two) times daily.  Dispense: 60 each; Refill: 12    No sign of infection today  PT using artificial tears with no relief  Start Xiidra bid OU      RTC PRN if symptoms worsen or not resolved x 1 week  Otherwise, RTC as scheduled for dilated exam, dOCT and gOCT  Discussed above and all questions were answered.

## 2024-12-12 ENCOUNTER — PATIENT MESSAGE (OUTPATIENT)
Dept: RESEARCH | Facility: HOSPITAL | Age: 39
End: 2024-12-12
Payer: COMMERCIAL

## 2025-02-20 ENCOUNTER — OFFICE VISIT (OUTPATIENT)
Dept: OPHTHALMOLOGY | Facility: CLINIC | Age: 40
End: 2025-02-20
Payer: COMMERCIAL

## 2025-02-20 DIAGNOSIS — H47.20 OPTIC ATROPHY OF LEFT EYE: Primary | ICD-10-CM

## 2025-02-20 DIAGNOSIS — H52.223 REGULAR ASTIGMATISM OF BOTH EYES: ICD-10-CM

## 2025-02-20 NOTE — PROGRESS NOTES
HPI     Annual Exam            Comments: Pt presents for an annual exam.   VA stable; using reading gls; not w/ pt today.     Denies flashes/ floaters/ pains at this time.           Comments    1. Optis Neutritis OS x July 2023    Last MRI: 7/21/23  Last HVF: 3/19/24  Last gOCT: 11/06/23             Last edited by Silva Hayden on 2/20/2025  1:42 PM.            Assessment /Plan     For exam results, see Encounter Report.    Optic atrophy of left eye  -     Posterior Segment OCT Optic Nerve- Both eyes  2/2 Resolved optic neuritis 2023  Normal NFL scans OU  Stable GCL OU  Monitor 12 months    Regular astigmatism of both eyes  Spec Rx is optional, ok to continue with OTC readers      RTC 1 yr for dilated eye exam, dOCT and gOCT or PRN if any problems.   Discussed above and answered questions.

## 2025-02-24 ENCOUNTER — LAB VISIT (OUTPATIENT)
Dept: LAB | Facility: HOSPITAL | Age: 40
End: 2025-02-24
Attending: FAMILY MEDICINE
Payer: COMMERCIAL

## 2025-02-24 ENCOUNTER — OFFICE VISIT (OUTPATIENT)
Dept: INTERNAL MEDICINE | Facility: CLINIC | Age: 40
End: 2025-02-24
Payer: COMMERCIAL

## 2025-02-24 VITALS
OXYGEN SATURATION: 98 % | TEMPERATURE: 97 F | HEART RATE: 88 BPM | RESPIRATION RATE: 18 BRPM | DIASTOLIC BLOOD PRESSURE: 74 MMHG | BODY MASS INDEX: 49.95 KG/M2 | SYSTOLIC BLOOD PRESSURE: 112 MMHG | WEIGHT: 282 LBS

## 2025-02-24 DIAGNOSIS — D64.9 LOW HEMOGLOBIN: Primary | ICD-10-CM

## 2025-02-24 DIAGNOSIS — A60.00 GENITAL HERPES SIMPLEX, UNSPECIFIED SITE: ICD-10-CM

## 2025-02-24 DIAGNOSIS — F32.A ANXIETY AND DEPRESSION: ICD-10-CM

## 2025-02-24 DIAGNOSIS — F41.9 ANXIETY AND DEPRESSION: ICD-10-CM

## 2025-02-24 DIAGNOSIS — Z86.69 HISTORY OF OPTIC NEURITIS: ICD-10-CM

## 2025-02-24 DIAGNOSIS — Z76.89 ENCOUNTER TO ESTABLISH CARE: ICD-10-CM

## 2025-02-24 DIAGNOSIS — D64.9 LOW HEMOGLOBIN: ICD-10-CM

## 2025-02-24 DIAGNOSIS — K21.9 GASTROESOPHAGEAL REFLUX DISEASE WITHOUT ESOPHAGITIS: ICD-10-CM

## 2025-02-24 DIAGNOSIS — K59.00 CONSTIPATION, UNSPECIFIED CONSTIPATION TYPE: ICD-10-CM

## 2025-02-24 DIAGNOSIS — E66.01 MORBID OBESITY WITH BMI OF 45.0-49.9, ADULT: ICD-10-CM

## 2025-02-24 LAB
ALBUMIN SERPL BCP-MCNC: 3.6 G/DL (ref 3.5–5.2)
ALP SERPL-CCNC: 74 U/L (ref 40–150)
ALT SERPL W/O P-5'-P-CCNC: 14 U/L (ref 10–44)
ANION GAP SERPL CALC-SCNC: 11 MMOL/L (ref 8–16)
AST SERPL-CCNC: 20 U/L (ref 10–40)
BASOPHILS # BLD AUTO: 0.03 K/UL (ref 0–0.2)
BASOPHILS NFR BLD: 0.6 % (ref 0–1.9)
BILIRUB SERPL-MCNC: 0.3 MG/DL (ref 0.1–1)
BUN SERPL-MCNC: 8 MG/DL (ref 6–20)
CALCIUM SERPL-MCNC: 8.9 MG/DL (ref 8.7–10.5)
CHLORIDE SERPL-SCNC: 105 MMOL/L (ref 95–110)
CHOLEST SERPL-MCNC: 199 MG/DL (ref 120–199)
CHOLEST/HDLC SERPL: 2.8 {RATIO} (ref 2–5)
CO2 SERPL-SCNC: 22 MMOL/L (ref 23–29)
CREAT SERPL-MCNC: 0.7 MG/DL (ref 0.5–1.4)
DIFFERENTIAL METHOD BLD: ABNORMAL
EOSINOPHIL # BLD AUTO: 0.1 K/UL (ref 0–0.5)
EOSINOPHIL NFR BLD: 2.5 % (ref 0–8)
ERYTHROCYTE [DISTWIDTH] IN BLOOD BY AUTOMATED COUNT: 13.3 % (ref 11.5–14.5)
EST. GFR  (NO RACE VARIABLE): >60 ML/MIN/1.73 M^2
ESTIMATED AVG GLUCOSE: 114 MG/DL (ref 68–131)
FERRITIN SERPL-MCNC: 54 NG/ML (ref 20–300)
GLUCOSE SERPL-MCNC: 86 MG/DL (ref 70–110)
HBA1C MFR BLD: 5.6 % (ref 4–5.6)
HCT VFR BLD AUTO: 42.7 % (ref 37–48.5)
HDLC SERPL-MCNC: 71 MG/DL (ref 40–75)
HDLC SERPL: 35.7 % (ref 20–50)
HGB BLD-MCNC: 12.6 G/DL (ref 12–16)
IMM GRANULOCYTES # BLD AUTO: 0.01 K/UL (ref 0–0.04)
IMM GRANULOCYTES NFR BLD AUTO: 0.2 % (ref 0–0.5)
LDLC SERPL CALC-MCNC: 107.4 MG/DL (ref 63–159)
LYMPHOCYTES # BLD AUTO: 2.5 K/UL (ref 1–4.8)
LYMPHOCYTES NFR BLD: 49.8 % (ref 18–48)
MCH RBC QN AUTO: 28.2 PG (ref 27–31)
MCHC RBC AUTO-ENTMCNC: 29.5 G/DL (ref 32–36)
MCV RBC AUTO: 96 FL (ref 82–98)
MONOCYTES # BLD AUTO: 0.6 K/UL (ref 0.3–1)
MONOCYTES NFR BLD: 11 % (ref 4–15)
NEUTROPHILS # BLD AUTO: 1.8 K/UL (ref 1.8–7.7)
NEUTROPHILS NFR BLD: 35.9 % (ref 38–73)
NONHDLC SERPL-MCNC: 128 MG/DL
NRBC BLD-RTO: 0 /100 WBC
PLATELET # BLD AUTO: 307 K/UL (ref 150–450)
PMV BLD AUTO: 12.7 FL (ref 9.2–12.9)
POTASSIUM SERPL-SCNC: 4.3 MMOL/L (ref 3.5–5.1)
PROT SERPL-MCNC: 7.3 G/DL (ref 6–8.4)
RBC # BLD AUTO: 4.47 M/UL (ref 4–5.4)
SODIUM SERPL-SCNC: 138 MMOL/L (ref 136–145)
TRIGL SERPL-MCNC: 103 MG/DL (ref 30–150)
TSH SERPL DL<=0.005 MIU/L-ACNC: 1.99 UIU/ML (ref 0.4–4)
WBC # BLD AUTO: 5.1 K/UL (ref 3.9–12.7)

## 2025-02-24 PROCEDURE — 84443 ASSAY THYROID STIM HORMONE: CPT | Performed by: FAMILY MEDICINE

## 2025-02-24 PROCEDURE — 3078F DIAST BP <80 MM HG: CPT | Mod: CPTII,S$GLB,, | Performed by: FAMILY MEDICINE

## 2025-02-24 PROCEDURE — 99999 PR PBB SHADOW E&M-EST. PATIENT-LVL III: CPT | Mod: PBBFAC,,, | Performed by: FAMILY MEDICINE

## 2025-02-24 PROCEDURE — 82728 ASSAY OF FERRITIN: CPT | Performed by: FAMILY MEDICINE

## 2025-02-24 PROCEDURE — 83036 HEMOGLOBIN GLYCOSYLATED A1C: CPT | Performed by: FAMILY MEDICINE

## 2025-02-24 PROCEDURE — 3074F SYST BP LT 130 MM HG: CPT | Mod: CPTII,S$GLB,, | Performed by: FAMILY MEDICINE

## 2025-02-24 PROCEDURE — 99214 OFFICE O/P EST MOD 30 MIN: CPT | Mod: S$GLB,,, | Performed by: FAMILY MEDICINE

## 2025-02-24 PROCEDURE — 1159F MED LIST DOCD IN RCRD: CPT | Mod: CPTII,S$GLB,, | Performed by: FAMILY MEDICINE

## 2025-02-24 PROCEDURE — 85025 COMPLETE CBC W/AUTO DIFF WBC: CPT | Performed by: FAMILY MEDICINE

## 2025-02-24 PROCEDURE — 80053 COMPREHEN METABOLIC PANEL: CPT | Performed by: FAMILY MEDICINE

## 2025-02-24 PROCEDURE — 3008F BODY MASS INDEX DOCD: CPT | Mod: CPTII,S$GLB,, | Performed by: FAMILY MEDICINE

## 2025-02-24 PROCEDURE — G2211 COMPLEX E/M VISIT ADD ON: HCPCS | Mod: S$GLB,,, | Performed by: FAMILY MEDICINE

## 2025-02-24 PROCEDURE — 80061 LIPID PANEL: CPT | Performed by: FAMILY MEDICINE

## 2025-02-24 RX ORDER — OMEPRAZOLE 20 MG/1
20 TABLET, DELAYED RELEASE ORAL DAILY
COMMUNITY

## 2025-02-24 NOTE — PROGRESS NOTES
Subjective:       Patient ID: Briana Thomas is a 39 y.o. female.    Chief Complaint:   HPI    Problem List[1]    Past Medical History:   Diagnosis Date    Abnormal Pap smear of cervix     Abnormal uterine bleeding     Amenorrhea     Genital herpes     Migraines     Optic neuritis     Staph infection 06/2021    Left leg       Past Surgical History:   Procedure Laterality Date    CRYOTHERAPY  05/2011    in [place]    HYSTEROSCOPY WITH DILATION AND CURETTAGE OF UTERUS  09/23/2024    SKIN BIOPSY  06/2021    Left Leg       Family History   Problem Relation Name Age of Onset    Hypertension Mother Mother     Migraines Mother Mother     Other (prediabetes) Mother Mother     Hypertension Father Father     Diabetes Father Father         prediabetes    Rheum arthritis Maternal Grandmother Jamie     Asthma Maternal Grandmother Ayala     Hypertension Maternal Grandmother Ayala     Hypertension Maternal Grandfather Maternal Grandfather     Asthma Maternal Grandfather Maternal Grandfather     Rheum arthritis Paternal Grandmother Veronika Thomas     Lung cancer Paternal Grandfather Paternal Grandfather     Cancer Paternal Grandfather Paternal Grandfather         Lung    Hypertension Maternal Uncle All 3 maternal uncles     Hypertension Maternal Uncle All 3 maternal uncles        Tobacco Use History[2]    .    For further HPI details, see assessment and plan.    Review of Systems    Objective:      Vitals:    02/24/25 1016   BP: 112/74   Pulse: 88   Resp: 18   Temp: 96.9 °F (36.1 °C)       Physical Exam  Constitutional:       General: She is not in acute distress.     Appearance: She is not ill-appearing.   Pulmonary:      Effort: Pulmonary effort is normal. No respiratory distress.   Neurological:      General: No focal deficit present.      Mental Status: She is alert.   Psychiatric:         Mood and Affect: Mood normal.         Behavior: Behavior normal.         Assessment:       1. Low hemoglobin    2. Morbid obesity with  BMI of 45.0-49.9, adult    3. Gastroesophageal reflux disease without esophagitis    4. Constipation, unspecified constipation type    5. Anxiety and depression    6. Encounter to establish care    7. History of optic neuritis    8. Genital herpes simplex, unspecified site        Plan:   presents today to establish care with a new primary care physician.      Reviewed problem list and past medical history and past surgical history with the patient.    Low hemoglobin   Mildly diminished   Not overly concerning but will update CBC along with a ferritin.  No blood in her stool.  Does have ongoing menstruation.  We will monitor.      Concerning obesity   Body mass index 49.95.  Heavy emphasis on the absolute importance of consistent caloric deficit in regular physical activity.  Strongly encouraged weight loss daily exercise.  Heavy emphasis on this topic.  BMI 49.95 at her age is clinically concerning    Gastroesophageal reflux disease   Patient only necessitates as needed medication.  Prilosec on a p.r.n. basis over-the-counter.      Constipation   His issues mostly resolved with fiber supplementation.  Seems to be doing well.    Genital HSV  Doing well - no out breaks for years.   Offered prn anti-viral just in case. Declines prescription for antiviral in offered      H/o  Optic neuritis -   Reviewed note  2'20'25  Has f/u planned 1 year    H/o anxiety and depression -   H/o some depressed mood -has previously been medicated. - tried wellbutrin - she stopped the med.  Presently stable      Insomnia  Using white noise  Falling asleep is the issue.  Not intersted in meds.  Sleep hygiene discussed  Consider trazodone.      This note was verbally dictated, please excuse any type errors.  Plan to update all routine primary care labs.  We will address accordingly if need be  Declines vaccine         [1]   Patient Active Problem List  Diagnosis    Optic neuritis, left    Constipation    Gastroesophageal reflux disease     Class 3 severe obesity due to excess calories with serious comorbidity and body mass index (BMI) of 45.0 to 49.9 in adult    Anxiety and depression    Genital herpes simplex    History of optic neuritis    Morbid obesity with BMI of 45.0-49.9, adult    Low hemoglobin   [2]   Social History  Tobacco Use   Smoking Status Never    Passive exposure: Past   Smokeless Tobacco Never

## 2025-02-25 ENCOUNTER — RESULTS FOLLOW-UP (OUTPATIENT)
Dept: INTERNAL MEDICINE | Facility: CLINIC | Age: 40
End: 2025-02-25